# Patient Record
Sex: MALE | Race: AMERICAN INDIAN OR ALASKA NATIVE
[De-identification: names, ages, dates, MRNs, and addresses within clinical notes are randomized per-mention and may not be internally consistent; named-entity substitution may affect disease eponyms.]

---

## 2017-05-11 ENCOUNTER — HOSPITAL ENCOUNTER (INPATIENT)
Dept: HOSPITAL 42 - ED | Age: 82
LOS: 4 days | Discharge: HOME HEALTH | DRG: 313 | End: 2017-05-15
Attending: INTERNAL MEDICINE | Admitting: HOSPITALIST
Payer: MEDICARE

## 2017-05-11 VITALS — BODY MASS INDEX: 31.4 KG/M2

## 2017-05-11 DIAGNOSIS — G62.9: ICD-10-CM

## 2017-05-11 DIAGNOSIS — H40.9: ICD-10-CM

## 2017-05-11 DIAGNOSIS — Z79.82: ICD-10-CM

## 2017-05-11 DIAGNOSIS — I51.7: ICD-10-CM

## 2017-05-11 DIAGNOSIS — H91.90: ICD-10-CM

## 2017-05-11 DIAGNOSIS — I08.3: ICD-10-CM

## 2017-05-11 DIAGNOSIS — Z85.038: ICD-10-CM

## 2017-05-11 DIAGNOSIS — Z79.899: ICD-10-CM

## 2017-05-11 DIAGNOSIS — Z87.891: ICD-10-CM

## 2017-05-11 DIAGNOSIS — Z90.49: ICD-10-CM

## 2017-05-11 DIAGNOSIS — Z85.840: ICD-10-CM

## 2017-05-11 DIAGNOSIS — Z80.9: ICD-10-CM

## 2017-05-11 DIAGNOSIS — I11.9: ICD-10-CM

## 2017-05-11 DIAGNOSIS — R60.0: ICD-10-CM

## 2017-05-11 DIAGNOSIS — Z92.21: ICD-10-CM

## 2017-05-11 DIAGNOSIS — Z82.3: ICD-10-CM

## 2017-05-11 DIAGNOSIS — R07.89: Primary | ICD-10-CM

## 2017-05-11 DIAGNOSIS — R41.82: ICD-10-CM

## 2017-05-11 DIAGNOSIS — I44.0: ICD-10-CM

## 2017-05-11 DIAGNOSIS — R40.2412: ICD-10-CM

## 2017-05-11 DIAGNOSIS — H54.8: ICD-10-CM

## 2017-05-11 LAB
ADD MANUAL DIFF?: NO
ALBUMIN/GLOB SERPL: 1.1 {RATIO} (ref 1.1–1.8)
ALP SERPL-CCNC: 45 U/L (ref 38–133)
ALT SERPL-CCNC: 54 U/L (ref 7–56)
AST SERPL-CCNC: 28 U/L (ref 15–59)
BASOPHILS # BLD AUTO: 0.01 K/MM3 (ref 0–2)
BASOPHILS NFR BLD: 0.3 % (ref 0–3)
BILIRUB SERPL-MCNC: 0.6 MG/DL (ref 0.2–1.3)
BUN SERPL-MCNC: 19 MG/DL (ref 7–21)
CALCIUM SERPL-MCNC: 9.5 MG/DL (ref 8.4–10.5)
CHLORIDE SERPL-SCNC: 105 MMOL/L (ref 98–107)
CO2 SERPL-SCNC: 29 MMOL/L (ref 21–33)
EOSINOPHIL # BLD: 0.2 10*3/UL (ref 0–0.7)
EOSINOPHIL NFR BLD: 5.4 % (ref 1.5–5)
ERYTHROCYTE [DISTWIDTH] IN BLOOD BY AUTOMATED COUNT: 12.8 % (ref 11.5–14.5)
GLOBULIN SER-MCNC: 3.6 GM/DL
GLUCOSE SERPL-MCNC: 91 MG/DL (ref 70–110)
GRANULOCYTES # BLD: 1.79 10*3/UL (ref 1.4–6.5)
GRANULOCYTES NFR BLD: 46.2 % (ref 50–68)
HCT VFR BLD CALC: 35 % (ref 42–52)
LYMPHOCYTES # BLD: 1.5 10*3/UL (ref 1.2–3.4)
LYMPHOCYTES NFR BLD AUTO: 38 % (ref 22–35)
MCH RBC QN AUTO: 32.3 PG (ref 25–35)
MCHC RBC AUTO-ENTMCNC: 33.7 G/DL (ref 31–37)
MCV RBC AUTO: 95.9 FL (ref 80–105)
MONOCYTES # BLD AUTO: 0.4 10*3/UL (ref 0.1–0.6)
MONOCYTES NFR BLD: 10.1 % (ref 1–6)
PLATELET # BLD: 212 10^3/UL (ref 120–450)
PMV BLD AUTO: 9.7 FL (ref 7–11)
POTASSIUM SERPL-SCNC: 4.3 MMOL/L (ref 3.6–5)
PROT SERPL-MCNC: 7.5 G/DL (ref 5.8–8.3)
SODIUM SERPL-SCNC: 142 MMOL/L (ref 132–148)
TROPONIN I SERPL-MCNC: < 0.01 NG/ML
WBC # BLD AUTO: 3.9 10^3/UL (ref 4.5–11)

## 2017-05-11 NOTE — RAD
HISTORY:

chest pain  



COMPARISON:

5/3/2017 



FINDINGS:



LUNGS:

No active pulmonary disease.



PLEURA:

No significant pleural effusion identified, no pneumothorax apparent.



CARDIOVASCULAR:

Normal heart size.  Right subclavian central venous port.



OSSEOUS STRUCTURES:

No significant abnormalities.



VISUALIZED UPPER ABDOMEN:

Normal.



OTHER FINDINGS:

None.



IMPRESSION:

No active disease.

## 2017-05-11 NOTE — CP.PCM.HP
<Huang Jo - Last Filed: 05/11/17 19:01>





History of Present Illness





- History of Present Illness


History of Present Illness: 





CC: Chest pain





86yo M with PMHx of HTN, Colon CA s/p resection, Eye CA s/p L eye resection 

here for evaluation of chest pain. Patient was at home when he was lifting a 

heavy object and experienced a left sided chest and shoulder pain with 

shortness of breath. Pain lasted about 20mins and has since resolved. Pain 

radiated to the left shoulder. At its worst, pain was 7/10. He has never 

experienced a pain such as this before. No exacerbating or remitting factors. 

He states that he has been increasingly more short of breath over the past 

week. He also noticed bilateral lower extremity edema but is unsure when it 

started. He is ambulatory with cane and assistance with home health aide. 

Denies diaphoresis. Denies N/V/D. No Abd pain. No F/C. No Headache. No urinary 

complaints.





PMD: Mutterperl





PMHx: HTN, Colon CA s/p chemo and resection 1994, L eye CA s/p resection 1992. 


PSHx: Colon CA resection 1994, L Eye CA s/p resection. Right Inguinal hernia 

repair


Family Hx: Mother with unknown CA


Social Hx: Previous smoker, quit 2000, Current occasional ETOH use (beer, last 

use 2 weeks ago), Denies any illicit drugs. Lives at home with daughter


NKDA


Meds: Amlodipine, Tamsulosin





Present on Admission





- Present on Admission


Any Indicators Present on Admission: No





Review of Systems





- Review of Systems


All systems: reviewed and no additional remarkable complaints except





- Constitutional


Constitutional: absent: Chills, Fever





- EENT


Eyes: Loss of Vision (left eye)


Ears: absent: Decreased Hearing


Nose/Mouth/Throat: absent: Epistaxis





- Cardiovascular


Cardiovascular: Chest Pain, Dyspnea, Leg Edema.  absent: Diaphoresis





- Respiratory


Respiratory: Dyspnea.  absent: Cough, Hemoptysis





- Gastrointestinal


Gastrointestinal: absent: Abdominal Pain, Diarrhea, Nausea, Vomiting





- Genitourinary


Genitourinary: absent: Dysuria





- Musculoskeletal


Musculoskeletal: absent: Back Pain





- Neurological


Neurological: absent: Abnormal Gait





- Psychiatric


Psychiatric: absent: Anxiety





- Endocrine


Endocrine: absent: Palpitations





Past Patient History





- Infectious Disease


Hx of Infectious Diseases: None





- Tetanus Immunizations


Tetanus Immunization: Unknown





- Past Medical History & Family History


Past Family History: Reviewed and not pertinent





- Past Social History


Smoking Status: Former Smoker





- CARDIAC


Hx Cardiac Disorders: Yes


Hx Hypertension: Yes





- PULMONARY


Hx Respiratory Disorders: No





- NEUROLOGICAL


Hx Neurological Disorder: No





- HEENT


Hx HEENT Problems: Yes


Hx Blind: Yes (left eye cancer)


Hx Glaucoma: Yes (right eye)





- RENAL


Hx Chronic Kidney Disease: No





- ENDOCRINE/METABOLIC


Hx Endocrine Disorders: No





- HEMATOLOGICAL/ONCOLOGICAL


Hx Blood Disorders: No





- INTEGUMENTARY


Hx Dermatological Problems: No





- MUSCULOSKELETAL/RHEUMATOLOGICAL


Hx Musculoskeletal Disorders: No





- GASTROINTESTINAL


Hx Gastrointestinal Disorders: No





- GENITOURINARY/GYNECOLOGICAL


Hx Genitourinary Disorders: No





- PSYCHIATRIC


Hx Psychophysiologic Disorder: No


Hx Depression: No


Hx Emotional Abuse: No


Hx Physical Abuse: No


Hx Substance Use: No





- SURGICAL HISTORY


Hx Eye Surgery: Yes


Other/Comment: intestinal surgery





- ANESTHESIA


Hx Anesthesia: Yes


Hx Anesthesia Reactions: No


Hx Malignant Hyperthermia: No





Meds


Home Medications: 


 Home Medication List











 Medication  Instructions  Recorded  Confirmed  Type


 


hydroCHLOROthiazide [Microzide] 12.5 mg PO DAILY #30 cap 05/12/17  Rx











Allergies/Adverse Reactions: 


 Allergies











Allergy/AdvReac Type Severity Reaction Status Date / Time


 


No Known Allergies Allergy   Verified 05/11/17 18:20














Physical Exam





- Constitutional


Appears: Well, No Acute Distress





- Head Exam


Head Exam: ATRAUMATIC, NORMAL INSPECTION, NORMOCEPHALIC





- Eye Exam


Eye Exam: absent: Scleral icterus


Additional comments: 





Left eye s/p surgery. Right eye EOMI





- ENT Exam


ENT Exam: Mucous Membranes Moist





- Respiratory Exam


Respiratory Exam: Clear to Auscultation Bilateral, NORMAL BREATHING PATTERN.  

absent: Accessory Muscle Use, Rales, Rhonchi, Wheezes





- Cardiovascular Exam


Cardiovascular Exam: RRR, +S1, +S2.  absent: JVD





- GI/Abdominal Exam


GI & Abdominal Exam: Normal Bowel Sounds, Soft.  absent: Distended, Guarding, 

Rebound





- Extremities Exam


Extremities exam: Positive for: pedal edema


Additional comments: 





bilateral 2+ pitting pretibial edema





- Neurological Exam


Neurological exam: Alert, Oriented x3





- Psychiatric Exam


Psychiatric exam: Normal Affect, Normal Mood





- Skin


Skin Exam: Dry, Intact, Normal Color, Warm





Results





- Vital Signs


Recent Vital Signs: 





 Last Vital Signs











Temp      


 


Pulse  78   05/11/17 17:05


 


Resp  18   05/11/17 17:05


 


BP  141/96 H  05/11/17 17:05


 


Pulse Ox  96   05/11/17 17:05














- Labs


Result Diagrams: 


 05/11/17 16:17





 05/11/17 16:17


Labs: 





 Laboratory Results - last 24 hr











  05/11/17 05/11/17





  16:17 16:17


 


WBC  3.9 L D 


 


RBC  3.65 


 


Hgb  11.8 L 


 


Hct  35.0 L 


 


MCV  95.9 


 


MCH  32.3 


 


MCHC  33.7 


 


RDW  12.8 


 


Plt Count  212 


 


MPV  9.7 


 


Gran %  46.2 L 


 


Lymph % (Auto)  38.0 H 


 


Mono % (Auto)  10.1 H 


 


Eos % (Auto)  5.4 H 


 


Baso % (Auto)  0.3 


 


Gran #  1.79 


 


Lymph #  1.5 


 


Mono #  0.4 


 


Eos #  0.2 


 


Baso #  0.01 


 


Sodium   142


 


Potassium   4.3


 


Chloride   105


 


Carbon Dioxide   29


 


Anion Gap   12


 


BUN   19


 


Creatinine   1.1


 


Est GFR ( Amer)   > 60


 


Est GFR (Non-Af Amer)   > 60


 


Random Glucose   91


 


Calcium   9.5


 


Total Bilirubin   0.6


 


AST   28


 


ALT   54


 


Alkaline Phosphatase   45


 


Lactate Dehydrogenase   417


 


Total Creatine Kinase   76


 


Troponin I   < 0.01


 


NT-Pro-B Natriuret Pep   59.7


 


Total Protein   7.5


 


Albumin   3.8


 


Globulin   3.6


 


Albumin/Globulin Ratio   1.1














- EKG Data


EKG Interpreted by: Myself


EKG shows normal: Sinus rhythm


Rate: Normal





- EKG Data


EKG comments: 





1st degree AV Block. OR prolonged. No ST changes





Assessment & Plan





- Assessment and Plan (Free Text)


Assessment: 





86yo M with PMHx of HTN, Colon CA s/p chemo and resection, L Eye CA s/p 

resection here for evaluation of SOB and CP





1. Chest pain


EKG- no ST changes. 1st degree AV block. OR prolonged


Troponin negative x1. 


pBNP normal


CXR - no active disease


serial troponins


Consult Cardiology, Dr. Morrissey, appreciate recs


NPO past midnight for possible Stress test in the AM. f/u Cardio recs


f/u TSH


f/u Lipid panel


f/u HbA1c


f/u ECHO


tele monitoring


ASA 81 daily





2. Lower extremity edema


f/u ECHO


f/u Bilateral LE doppler


continue to monitor


Strict I&Os


Fall precautions





3. Hx of HTN


continue home meds


Amlodipine 5mg PO Daily





4. PPx


SCDs


Famotidine 20mg PO Daily





Discussed case with Dr. Gary Jo PGY1


[phone]





<Ivonne Vega - Last Filed: 05/13/17 16:11>





Results





- Vital Signs


Recent Vital Signs: 





 Last Vital Signs











Temp  98.5 F   05/13/17 11:46


 


Pulse  70   05/13/17 11:46


 


Resp  20   05/13/17 11:46


 


BP  154/77 H  05/13/17 11:46


 


Pulse Ox  97   05/13/17 05:51














- Labs


Result Diagrams: 


 05/13/17 07:00





 05/13/17 07:00


Labs: 





 Laboratory Results - last 24 hr











  05/13/17 05/13/17





  07:00 07:00


 


WBC  3.3 L 


 


RBC  3.77 


 


Hgb  12.0 L 


 


Hct  35.7 L 


 


MCV  94.7 


 


MCH  31.8 


 


MCHC  33.6 


 


RDW  12.6 


 


Plt Count  210 


 


MPV  9.6 


 


Gran %  45.5 L 


 


Lymph % (Auto)  37.4 H 


 


Mono % (Auto)  10.5 H 


 


Eos % (Auto)  6.0 H 


 


Baso % (Auto)  0.6 


 


Gran #  1.52 


 


Lymph #  1.3 


 


Mono #  0.4 


 


Eos #  0.2 


 


Baso #  0.02 


 


Sodium   140


 


Potassium   3.7


 


Chloride   104


 


Carbon Dioxide   27


 


Anion Gap   13


 


BUN   15


 


Creatinine   1.1


 


Est GFR ( Amer)   > 60


 


Est GFR (Non-Af Amer)   > 60


 


Random Glucose   85


 


Calcium   9.2














Attending/Attestation





- Attestation


I have personally seen and examined this patient.: Yes


I have fully participated in the care of the patient.: Yes


I have reviewed all pertinent clinical information: Yes


Notes (Text): 


I have seen and examined this patient at bedside. This is 87 year old male with 

history of HTN, former smoker, colon cancer s/p resection & chemo, left eye 

cancer s/p resection who got admitted for evaluation of chest pain which is 

most likely musculoskeletal in origin however will need to rule out ACS. Will 

order serial troponins and ekg. First set of troponin and ekg is normal. BNP is 

normal. CXR is negative. Will check tsh, hba1c, LE duplex and echo. Will 

consult cardiologist for possible stress test. Will make the patient npo past 

midnight. DC norvasc as he has bilateral LE edema. Upon discharge patient will 

follow up with Dr Hedrick. 


Dr Ivonne Vega

## 2017-05-12 LAB
ADD MANUAL DIFF?: NO
ALBUMIN/GLOB SERPL: 1.2 {RATIO} (ref 1.1–1.8)
ALP SERPL-CCNC: 47 U/L (ref 38–133)
ALT SERPL-CCNC: 36 U/L (ref 7–56)
APTT BLD: 30.1 SECONDS (ref 23.7–30.8)
AST SERPL-CCNC: 24 U/L (ref 15–59)
BASOPHILS # BLD AUTO: 0.02 K/MM3 (ref 0–2)
BASOPHILS NFR BLD: 0.5 % (ref 0–3)
BILIRUB SERPL-MCNC: 1 MG/DL (ref 0.2–1.3)
BUN SERPL-MCNC: 17 MG/DL (ref 7–21)
CALCIUM SERPL-MCNC: 9.3 MG/DL (ref 8.4–10.5)
CHLORIDE SERPL-SCNC: 108 MMOL/L (ref 98–107)
CHOLEST SERPL-MCNC: 199 MG/DL (ref 130–200)
CO2 SERPL-SCNC: 27 MMOL/L (ref 21–33)
EOSINOPHIL # BLD: 0.2 10*3/UL (ref 0–0.7)
EOSINOPHIL NFR BLD: 4.5 % (ref 1.5–5)
ERYTHROCYTE [DISTWIDTH] IN BLOOD BY AUTOMATED COUNT: 12.7 % (ref 11.5–14.5)
GLOBULIN SER-MCNC: 3.2 GM/DL
GLUCOSE SERPL-MCNC: 83 MG/DL (ref 70–110)
GRANULOCYTES # BLD: 1.9 10*3/UL (ref 1.4–6.5)
GRANULOCYTES NFR BLD: 50.2 % (ref 50–68)
HCT VFR BLD CALC: 35.9 % (ref 42–52)
INR PPP: 1.01 (ref 0.93–1.08)
LYMPHOCYTES # BLD: 1.4 10*3/UL (ref 1.2–3.4)
LYMPHOCYTES NFR BLD AUTO: 35.6 % (ref 22–35)
MCH RBC QN AUTO: 32.4 PG (ref 25–35)
MCHC RBC AUTO-ENTMCNC: 34 G/DL (ref 31–37)
MCV RBC AUTO: 95.2 FL (ref 80–105)
MONOCYTES # BLD AUTO: 0.4 10*3/UL (ref 0.1–0.6)
MONOCYTES NFR BLD: 9.2 % (ref 1–6)
PLATELET # BLD: 196 10^3/UL (ref 120–450)
PMV BLD AUTO: 9.5 FL (ref 7–11)
POTASSIUM SERPL-SCNC: 4 MMOL/L (ref 3.6–5)
PROT SERPL-MCNC: 6.9 G/DL (ref 5.8–8.3)
SODIUM SERPL-SCNC: 139 MMOL/L (ref 132–148)
TROPONIN I SERPL-MCNC: 0.01 NG/ML
WBC # BLD AUTO: 3.8 10^3/UL (ref 4.5–11)

## 2017-05-12 NOTE — CARD
--------------- APPROVED REPORT --------------





EXAM: Two-dimensional and M-mode echocardiogram with Doppler and 

color Doppler.



INDICATION

Chest Pain LVFX



2D DIMENSIONS 

Left Atrium (2D)5.2   (1.6-4.0cm)IVSd1.0   (0.7-1.1cm)

LVDd4.8   (3.9-5.9cm)PWd1.2   (0.7-1.1cm)

LVDs3.2   (2.5-4.0cm)FS (%) 34.4   %

LVEF (%)63.6   (>50%)



M-Mode DIMENSIONS 

Aortic Root3.40   (2.2-3.7cm)Aortic Cusp Exc.1.70   (1.5-2.0cm)



Aortic Valve

AoV Peak Ocwdhwlx434.0cm/sAoV VTI28.1cmAO Peak GR.7mmHg

LVOT Peak Mczsgpbv44.7cm/sLVOT VTI18.40cmAO Mean GR.4mmHg



Mitral Valve

MV E Zwnluhdb70.3cm/sMV A Exzrnhfw59.5cm/sE/A ratio0.7



TDI

Lateral E' Peak V7.12cm/sMedial E' Peak V6.43cm/sE/Lateral E'7.9

E/Medial E'8.8



Pulmonary Valve

PV Peak Ztfqatuk43.4cm/sPV Peak Grad.3mmHg



Tricuspid Valve

TR Peak Rayqdaez649zd/sRAP MCGLZWLY89jhJiTM Peak Gr.23mmHg

[id number]



 LEFT VENTRICLE 

The left ventricle is normal size. There is borderline to mild 

concentric left ventricular hypertrophy. The left ventricular 

function is normal.EF-60-65% There is normal LV segmental wall 

motion. Transmitral Doppler flow pattern is Grade III-reversible 

restrictive diastolic dysfunction. No left ventricle thrombus noted 

on this study. There is no ventricular septal defect visualized. 

There is no left ventricular aneurysm. There is no mass noted in the 

left ventricle.



 RIGHT VENTRICLE 

The right ventricle is normal size. There is normal right ventricular 

wall thickness. The right ventricular systolic function is normal.



 ATRIA 

The left atrium is mildly dilated. The right atrium size is normal. 

The interatrial septum is intact with no evidence for an atrial 

septal defect.



 AORTIC VALVE 

The aortic valve is thickened but opens well. There is trace aortic 

regurgitation. There is no aortic valvular stenosis. There is no 

aortic valvular vegetation.



 MITRAL VALVE 

The mitral valve is thickened but opens well. Mitral annular 

calcification is mild to moderate. Mitral regurgitation is mild. 

There is no mitral valve stenosis. There is no evidence of mitral 

valve prolapse.



 TRICUSPID VALVE 

The tricuspid valve leaflets are thickened , but open well. There is 

mild tricuspid regurgitation.RVSP-29 mmof Hg. There is no tricuspid 

valve stenosis. There is no tricuspid valve prolapse or vegetation.



 PULMONIC VALVE 

The pulmonic valve is borderline thickened. There is trace pulmonic 

valvular regurgitation. There is no pulmonic valvular stenosis.



 GREAT VESSELS 

The aortic root is normal in size. The ascending aorta is normal in 

size. The pulmonary artery is normal. The IVC is normal in size and 

collapses >50% with inspiration.



 PERICARDIAL EFFUSION 

There is no pleural effusion. There is no pericardial effusion.



<Conclusion>

The left ventricle is normal size.

There is borderline to mild concentric left ventricular hypertrophy.

The left ventricular function is normal.EF-60-65%

There is trace aortic regurgitation.

Mitral regurgitation is mild.

There is mild tricuspid regurgitation.RVSP-29 mmof Hg.

There is trace pulmonic valvular regurgitation.

The IVC is normal in size and collapses >50% with inspiration.

There is no pericardial effusion.

## 2017-05-12 NOTE — CP.PCM.PN
<Danyelle Kimbrough - Last Filed: 05/12/17 17:21>





Subjective





- Date & Time of Evaluation


Date of Evaluation: 05/12/17


Time of Evaluation: 17:21





- Subjective


Subjective: 





HOSPITALISTS PROGRESS NOTE





Pt is seen and examined at bedside.  No acute events overnight.  Patient state 

he slept well.  He states that his chest pain is not present currently but it 

is reproducible with movement of left arm and with palpation.  He denies having 

any SOB, abd pain, N/V/D/C. After speaking with patient's daughters, patient 

has not been at his baseline mentation for about 2 weeks now.  Daughters state 

that his behavior is more aggressive towards women and he has been more clumsy 

lately bumping into stuff.   





Objective





- Vital Signs/Intake and Output


Vital Signs (last 24 hours): 


 











Temp Pulse Resp BP Pulse Ox


 


 98.5 F   73   20   140/90   96 


 


 05/12/17 05:46  05/12/17 10:00  05/12/17 05:46  05/12/17 10:00  05/12/17 05:46








Intake and Output: 


 











 05/12/17 05/12/17





 06:59 18:59


 


Intake Total 0 540


 


Output Total 550 700


 


Balance -550 -160














- Medications


Medications: 


 Current Medications





Aspirin (Aspirin Chewable)  81 mg PO DAILY [facility]


   Last Admin: 05/12/17 13:50 Dose:  81 mg


Famotidine (Pepcid)  20 mg PO DAILY [facility]


   Last Admin: 05/12/17 13:52 Dose:  20 mg


Hydrochlorothiazide (Microzide)  12.5 mg PO DAILY [facility]


   Last Admin: 05/12/17 13:51 Dose:  12.5 mg


Ondansetron HCl (Zofran Inj)  4 mg IVP Q6 PRN


   PRN Reason: Nausea/Vomiting


Tamsulosin HCl (Flomax)  0.4 mg PO DAILY [facility]


   Last Admin: 05/12/17 13:50 Dose:  0.4 mg











- Labs


Labs: 


 





 05/12/17 07:00 





 05/12/17 07:00 





 











PT  10.9 Seconds (9.9-11.8)   05/12/17  07:00    


 


INR  1.01  (0.93-1.08)   05/12/17  07:00    


 


APTT  30.1 Seconds (23.7-30.8)   05/12/17  07:00    














- Constitutional


Appears: Non-toxic, No Acute Distress





- Head Exam


Head Exam: ATRAUMATIC





- Eye Exam


Eye Exam: EOMI





- ENT Exam


ENT Exam: Mucous Membranes Moist





- Respiratory Exam


Respiratory Exam: Clear to Ausculation Bilateral, NORMAL BREATHING PATTERN.  

absent: Rales, Rhonchi, Wheezes





- Cardiovascular Exam


Cardiovascular Exam: REGULAR RHYTHM, +S1, +S2.  absent: Gallop, Rubs, Murmur


Additional comments: 





left sided tenderness to palpation





- GI/Abdominal Exam


GI & Abdominal Exam: Soft, Normal Bowel Sounds.  absent: Distended, Firm, 

Guarding, Rigid, Tenderness





- Extremities Exam


Extremities Exam: absent: Pedal Edema, Tenderness





- Neurological Exam


Neurological Exam: Alert, Awake, Oriented x3





- Psychiatric Exam


Psychiatric exam: Normal Affect, Normal Mood





- Skin


Skin Exam: Dry, Intact, Normal Color, Warm





Assessment and Plan





- Assessment and Plan (Free Text)


Assessment: 





88yo M with PMHx of HTN, Colon CA s/p chemo and resection, L Eye CA s/p 

resection here for evaluation of SOB and CP.  Serial trops are negative and EKG 

showed no ST changes. Pro BNP is WNL and CXR is negative.   





1. Chest pain


Cardiology, Dr. Morrissey, is consulted.  appreciate recs


Awaiting stress test and echo result


TSH, lipid panel and HgbA1c are WNL


tele monitoring


ASA 81 daily





2. AMS x 2 weeks


Will get CT of head without contrast


Psych, Dr. Vinson is consulted


Neurology, Dr. Camp is consulted. 





3. Lower extremity edema


ECHO is pending 


LE doppler US is negative for DVT


continue to monitor


Strict I&Os


Fall precautions





4. Hx of HTN


Patient is switched to HCTZ 12.5 mg po qd.  Stopped norvasc due to LE swelling.

  





5. PPx


SCDs


Famotidine 20mg PO Daily





Discussed case with attending Dr. Vega








<Ivonne Vega - Last Filed: 05/13/17 16:18>





Objective





- Vital Signs/Intake and Output


Vital Signs (last 24 hours): 


 











Temp Pulse Resp BP Pulse Ox


 


 98.5 F   70   20   154/77 H  97 


 


 05/13/17 11:46  05/13/17 11:46  05/13/17 11:46  05/13/17 11:46  05/13/17 05:51








Intake and Output: 


 











 05/13/17 05/13/17





 06:59 18:59


 


Intake Total  1220


 


Balance  1220














- Medications


Medications: 


 Current Medications





Aspirin (Aspirin Chewable)  81 mg PO DAILY [facility]


   Last Admin: 05/13/17 10:23 Dose:  81 mg


Famotidine (Pepcid)  20 mg PO DAILY [facility]


   Last Admin: 05/13/17 10:23 Dose:  20 mg


Haloperidol (Haldol)  0.25 mg PO Q6 PRN; Protocol


   PRN Reason: Agitation


Hydrochlorothiazide (Microzide)  12.5 mg PO DAILY [facility]


   Last Admin: 05/13/17 10:23 Dose:  12.5 mg


Ondansetron HCl (Zofran Inj)  4 mg IVP Q6 PRN


   PRN Reason: Nausea/Vomiting


Tamsulosin HCl (Flomax)  0.4 mg PO DAILY [facility]


   Last Admin: 05/13/17 10:23 Dose:  0.4 mg











- Labs


Labs: 


 





 05/13/17 07:00 





 05/13/17 07:00 





 











PT  10.9 Seconds (9.9-11.8)   05/12/17  07:00    


 


INR  1.01  (0.93-1.08)   05/12/17  07:00    


 


APTT  30.1 Seconds (23.7-30.8)   05/12/17  07:00    














Attending/Attestation





- Attestation


I have personally seen and examined this patient.: Yes


I have fully participated in the care of the patient.: Yes


I have reviewed all pertinent clinical information, including history, physical 

exam and plan: Yes


Notes (Text): 








I have seen and examined this patient at bedside. This is 87 year old male with 

history of HTN, former smoker, colon cancer s/p resection & chemo, left eye 

cancer s/p resection who got admitted for evaluation of chest pain which is 

most likely musculoskeletal in origin.


Serial troponin and ekg was within normal limits. Given the risk factors, 

patient underwent stress test. Result of which is pending at this time. 


Patients daughter expressed concern today that patient has been very forgetful 

for the past 2 weeks and has been very clumsy. He also has developed behavioral 

disturbance and has developed agitation which is probably secondary to 

cognitive impairment however we need to rule out other causes. Will order CT 

scan, neuro and psych consult.  


LE swelling has improved. LE duplex is negative.


Start HCTZ for HTN.


Upon discharge patient will follow up with Dr Hedrick. 


Dr Ivonne Vega

## 2017-05-12 NOTE — CON
DATE: 05/12/2017



CHIEF COMPLAINT:  History of agitation and behavioral change for the past 2 weeks noted by daughter.



HISTORY OF PRESENT ILLNESS:  An 87-year-old man who is legally blind with past medical history of hyp
ertension, colon cancer status post resection, history of eye cancer status post left eye resection, 
who goes to a  and receives Meals on Wheels at home, who came in for shortness of breath and s
harp chest pain, which has been worked up by cardiology where he had an echocardiogram, which showed 
an EF of 60-65% with mild concentric left ventricular hypertrophy and ultrasound of the lower extremi
ties showing no evidence of deep vein thrombosis due to his lower extremity swelling. I was consulted
 because it was noted by the daughter that for the past 2 weeks, he has been more aggressive toward w
deshawnn and has been very clumsy and lately bumping into stuff. During my evaluation, he is alert, orien
abhay to person and place and year, knows the President but has poor attention and slow thought process
.  Recall after 5 minutes is 0/3.  He is legally blind.  He moves all extremities, no pronator drift 
seen.  He does have evidence of some underlying peripheral neuropathy from likely history of cancer a
s well as chemotherapy residuals.  He had a CAT scan.  Preliminary report pending.  Shows no acute in
tracranial abnormalities, chronic ischemic changes.  Awaiting official report.  He seems to be at his
 baseline, has mild to moderate cognitive impairment.



PAST MEDICAL HISTORY:  History of hypertension, colon cancer status post chemo and resection, left ey
e cancer status post resection.



REVIEW OF SYSTEMS:  A 14-point review of systems is negative except for the HPI.



MEDICATIONS: Reviewed via nursing reconciliation sheet.



ALLERGIES:  No known drug allergies.



FAMILY HISTORY: Noncontributory.



SOCIAL HISTORY:  No illicit drug use, smoking, or ETOH abuse.



FAMILY HISTORY:  Noncontributory.



PHYSICAL EXAMINATION:

VITAL SIGNS:  Temperature 98.3, pulse rate of 81, blood pressure 141/76, respiratory rate 20, oxygen 
saturation 96% on room air.

GENERAL:  The patient is sitting up in bed in no acute distress.

HEENT:  Atraumatic.  Extraocular muscles intact.  Has left eye resection from left eye cancer.

NECK:  Supple, no JVD, no adenopathy noted.

HEART:  S1, S2, normal rate and rhythm.  No murmurs, rubs, or gallops.

ABDOMEN:  Soft, nontender, nondistended.  Bowel sounds are present.

EXTREMITIES:  No clubbing, no cyanosis.  Peripheral pulses 2+ felt bilaterally.

NEUROLOGIC:  The patient is alert, oriented to person and place and year, has poor attention, slow th
ought process.  Recall after 5 minutes is 0/3.  Has a flat affect, very tangential and loose associat
ions of thoughts.  Cranial nerves II through XII are intact. Motor exam:  No pronator drift seen, sli
ght increased tone throughout. Sensory exam:  Decreased light touch and pinprick up to calves bilater
ally.  Decreased to vibration in the toes.  DTRs 2+ throughout, 1 at the knees and ankles. Coordinati
on:  Finger-to-nose is intact with eyes closed.  Gait is deferred for now.



LABORATORY DATA:  Sodium is 139, potassium 4, chloride of 108, carbon dioxide of 27, BUN of 17, creat
inine 1.  Random glucose of 8.3.  A1c is 4.7.



ASSESSMENT AND PLAN:  

1.  This is an 87-year-old man with past medical history of colon cancer status post chemotherapy and
 resection, history of hypertension, history of left eye cancer status post resection, legally blind,
 who is here in the hospital with shortness of breath and chest pain, followed by cardiology.  Echo s
howed ejection fraction at 60%.  Lower extremities showed no evidence of deep venous thrombosis.  EKG
 showed ST changes.  ProBNP is within normal limits.  Chest x-ray is negative.  Currently _____ cardi
ology recommendations. I was consulted for change in mental status for the past 2 weeks, becoming mor
e agitated and more clumsy.  His agitation is likely his cognitive impairment with behavioral disturb
ance.  Could consider low dose Seroquel 12.5 mg p.o. at bedtime and to get psychiatry's recommendatio
ns.

2.  Frequent orientation throughout the day and avoid nighttime interruptions.  He needs proper adequ
ate healthy heart diet and does have neuropathy from underlying history of cancer and chemotherapy, w
hich could be the reason why he has poor gait, balance, and is clumsy.  At this time, recommend physi
manisha therapy to help with his gait and balance and then continue with current present medical manageme
nt in terms of his cardiac issues.  No further neurological workup needed at this time. Possibly woul
d benefit from coenzyme Q10 400 mg p.o. daily, alpha lipoic acid 600 mg p.o. daily and outpatient Nam
enda XR 14 mg p.o. daily for underlying cognitive impairment.  At this time, continue with current pr
esent medical management.



Thank you for this consult.  Please reconsult if necessary. Will sign off.





__________________________________________

Huber Camp MD







cc:



DD: 05/12/2017 18:48:26  [id number]

TT: 05/12/2017 19:26:44

Confirmation # [id number]

Dictation # [id number]

ln

## 2017-05-12 NOTE — CARD
--------------- APPROVED REPORT --------------





Protocol: LEXISCAN

Test Type: Lexiscan Sestamibi Stress Test

Attending Physician: Dr. Guille Garcia

Referring Physician: Dr. Haseeb Matos  

Test Indications: Chest Pain

Height:5 ft  11  in

Weight:225lbs 

Medications: Norvas,Aspirin,Pepcid,Flomax

Medical History: 86 y/o male. Hx of chest pain,hypertension,

shortness of breath,former smoker,PVD,edema.

Target HR: 133 bpm

Resting ECG: RSR. RBBB.Prolonged KS.

Resting Heart Rate: 67 bpm

Resting Blood Pressure: 140/70mmHg

Submaximum (85%): 113 bpm



POST EXERCISE

Reason for Termination: Protocol completed

Target HR: No

Max HR: 65 bpm

59% of Maximum Predicted HR: 133 bpm

Exercise duration: 00:31 min:sec, 0 Stage

Exercise capacity: 1.0METs

Max Blood Pressure: 140/70mmHg

Blood Pressure response to exercise: normal resting BP - appropriate 

response

Heart Rate response to exercise: appropriate

Chest Pain: No, none

Angina index: 0

Arrhythmia: No, none

ST Change: No, none

Deviation: 0 mm



INTERPRETATION

Stress EKG Conclusion: IV LEXISCAN NUCLEAR STRESS TEST NEGATIVE FOR 

CHEST PAIN AND NEGATIVE FOR ST-T CHANGES.







NUCLEAR SCAN REPORT PENDING.



Signed by  Guille Garcia

Electronically Approved: 05/12/2017 12:50:05



EXAM: Myocardial Perfusion REST/STRESS

Stress Test Type: Pharmacologic



Imaging Protocol

Rest Spect myocardial perfusion imaging was performed in supine 

position 60 minutes following the injection of 10.3 mCi of Tc-99 

Myoview.

At peak stress, the patient was injected intravenously with 30.5mCi 

of Tc-99 tetrofosmin after an infusion time of 0 minutes and 10 

seconds.

Gated Stress Spect was performed 65 minutes after intravenous Tc-99 

Myoview injection.

The images were gated to evaluate regional wall motion and calculate 

ventricular ejection fraction.Images were reconstructed using 

backfilter projection method in short horizontal and verticle long 

axis. Spect slices were generated.



LV Perfusion

The quality of the study is good. The left ventricle is within normal 

limits in size. The right ventricle is unremarkable. The lung uptake 

is normal. The distribution of tracer reveals an area of mildly 

decreased perfusion in the mid to distal anterior and moderately and 

diffusely  decreased perfusion in the  inferior wall on the stress 

study. The remainder of the LV myocardium is unremarkable.  The rest 

myocardial perfusion study shows no significant change.



Wall Motion

Wall motion study shows good contractility of the left ventricle.  

LVEF = 60%.



Conclusion

1. Probably abnormal  SPECT myocardial perfusion study.

2. Mild, fixed,  anterior defect is  suggestive of  myocardial injury.

3. Fixed, inferior and inferolateral defects are probably due to 

diaphragmatic attenuation.

4. Normlal gated wall motion of the left ventricle.

## 2017-05-12 NOTE — CT
PROCEDURE:  CT HEAD WITHOUT CONTRAST.



HISTORY:

AMS x 2 weeks



COMPARISON:

None available. 



TECHNIQUE:

Axial computed tomography images were obtained through the head/brain 

without intravenous contrast.  



Radiation dose:



Total exam DLP = 725.84 mGy-cm.



This CT exam was performed using one or more of the following dose 

reduction techniques: Automated exposure control, adjustment of the 

mA and/or kV according to patient size, and/or use of iterative 

reconstruction technique.



FINDINGS:



HEMORRHAGE:

No acute parenchymal, subarachnoid or extra-axial hemorrhage. 



BRAIN:

Moderate to significant diffuse/confluent chronic white matter 

ischemic changes seen extending peripherally into the deep and 

subcortical white matter both cerebral hemispheres. In addition, 

there also appears to be a few scattered more discrete chronic 

lacunar-type infarcts within both basal nuclei. 



VENTRICLES:

Unremarkable. No hydrocephalus. 



CALVARIUM:

No acute calvarial fractures.



PARANASAL SINUSES:

Unremarkable as visualized. No significant inflammatory changes.



MASTOID AIR CELLS:

Unremarkable as visualized. No inflammatory changes.



OTHER FINDINGS:

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  Status 

post right cataract surgery.



IMPRESSION:

No acute intracranial hemorrhage.



Moderate to significant chronic white matter ischemic changes 

extending peripherally into the deep and subcortical white matter 

both cerebral hemispheres.  There are also scattered chronic 

bilateral basal nuclei lacunar type infarcts. 



Moderate generalized volume loss. 



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

## 2017-05-12 NOTE — US
HISTORY:

Leg pain and swelling. Evaluate for DVT



PHYSICIAN(S):  Darell Rogers MD.



TECHNIQUE:

Duplex sonography and color-flow Doppler with graded compression were 

used to evaluate the deep venous systems of both lower extremities. 



FINDINGS:

The visualized deep venous systems of both lower extremities are 

sonographically normal and compressible. Normal wave forms and 

augmentation are seen. There is no sonographic evidence for deep 

venous thrombosis in the visualized segments of both lower 

extremities.



IMPRESSION:

No sonographic evidence for deep venous thrombosis in the visualized 

segments of both lower extremities.

## 2017-05-12 NOTE — CON
DATE: 05/12/2017



SERVICE:  Cardiology.



CONSULTING PHYSICIAN:  Dr. Guille Morrissey.



REASON FOR CONSULTATION AND FOLLOWUP:  Shortness of breath, chest pain, rule out CAD.



BRIEF CLINICAL HISTORY:  This is an 87-year-old male, legally blind, who goes to a  center, hi
story of hypertension.  He said that he gets the food from the [employer] at his home.  He picke
d it up and went inside.  He felt some shortness of breath, though he gets usually shortness of breat
h, but ____ changes and some sharp pain in the chest, so called the daughter.  Daughter lives up the 
stairs, advised to come to the hospital for evaluation.  The patient denies any prior episode of ches
t pain, but complained of dyspnea on exertion.



PAST MEDICAL HISTORY:  Significant for hypertension, colon CA, status post resection, history of eye 
cancer, status post left eye resection.



PAST SURGICAL HISTORY:  Significant for colon cancer, history of resection of testicles, history of c
hemotherapy, history of resection of colon in 1994, history of left eye resection in 1992, history of
 right inguinal hernia repair as well.  



FAMILY HISTORY:  Noncontributory.



SOCIAL HISTORY:  Previous smoker, quit 2000, history of socially drinks alcohol.



ALLERGIES:  No known drug allergy.



CURRENT MEDICATIONS:  The patient is taking amlodipine for blood pressure and tamsulosin given by Dr. Matos.



REVIEW OF SYSTEMS:  As per HPI.



PHYSICAL EXAMINATION:

VITAL SIGNS:  Temperature afebrile, heart rate 70, blood pressure 141/82.

HEENT:  PERRLA.  Extraocular muscles intact.

NECK:  Supple.  No carotid bruits.  No thyromegaly.

CHEST:  Clear to auscultation.

HEART:  S1, S2 regular.

ABDOMEN:  Soft.

EXTREMITIES:  Clubbing and cyanosis negative.



BLOOD WORKUP:  As follows:  WBC 3.8, hemoglobin 12.2, hematocrit 35.9, platelet count 196.  Chemistry
 shows sodium 130, potassium 4, chloride 100, carbon dioxide 27, anion gap of 8, BUN 17, creatinine 1
.0.  Troponin 0.01, negative.  EKG showed normal sinus, marked sinus arrhythmia, right bundle branch 
block.:  



IMPRESSION:  Dyspnea on exertion, hypertension, shortness of breath, rule out ischemia, rule out hesham
nary artery disease though chest pain was atypical.  So far, troponin is negative, no evidence of acu
te coronary syndrome, no evidence of unstable angina, but given the multiple risk factors for coronar
y artery disease, suggest echo and a stress test today.  The patient lives by himself and is legally 
blind, so for risk stratification and for the sole reason because the patient cannot express and is l
egally blind, suggest a stress test before the patient goes home.  We will schedule today.  



Thank you, Dr. Vega, for providing us opportunity in taking care of the patient.  We will follow with
 you.





__________________________________________

Guille Morrissey MD







cc:Ivonne Vega MD



DD: 05/12/2017 07:51:37  [id number]

TT: 05/12/2017 09:20:45

Confirmation # [id number]

Dictation # [id number]

tn

## 2017-05-12 NOTE — CARD
--------------- APPROVED REPORT --------------





EKG Measurement

Heart [id number]

[id number]

[id number]

[id number]

[id number]



<Conclusion>

Sinus rhythm APCs

Right bundle branch block

STTW changes

## 2017-05-13 LAB
ADD MANUAL DIFF?: NO
BASOPHILS # BLD AUTO: 0.02 K/MM3 (ref 0–2)
BASOPHILS NFR BLD: 0.6 % (ref 0–3)
BUN SERPL-MCNC: 15 MG/DL (ref 7–21)
CALCIUM SERPL-MCNC: 9.2 MG/DL (ref 8.4–10.5)
CHLORIDE SERPL-SCNC: 104 MMOL/L (ref 95–110)
CO2 SERPL-SCNC: 27 MMOL/L (ref 21–33)
EOSINOPHIL # BLD: 0.2 10*3/UL (ref 0–0.7)
EOSINOPHIL NFR BLD: 6 % (ref 1.5–5)
ERYTHROCYTE [DISTWIDTH] IN BLOOD BY AUTOMATED COUNT: 12.6 % (ref 11.5–14.5)
GLUCOSE SERPL-MCNC: 85 MG/DL (ref 70–110)
GRANULOCYTES # BLD: 1.52 10*3/UL (ref 1.4–6.5)
GRANULOCYTES NFR BLD: 45.5 % (ref 50–68)
HCT VFR BLD CALC: 35.7 % (ref 42–52)
LYMPHOCYTES # BLD: 1.3 10*3/UL (ref 1.2–3.4)
LYMPHOCYTES NFR BLD AUTO: 37.4 % (ref 22–35)
MCH RBC QN AUTO: 31.8 PG (ref 25–35)
MCHC RBC AUTO-ENTMCNC: 33.6 G/DL (ref 31–37)
MCV RBC AUTO: 94.7 FL (ref 80–105)
MONOCYTES # BLD AUTO: 0.4 10*3/UL (ref 0.1–0.6)
MONOCYTES NFR BLD: 10.5 % (ref 1–6)
PLATELET # BLD: 210 10^3/UL (ref 120–450)
PMV BLD AUTO: 9.6 FL (ref 7–11)
POTASSIUM SERPL-SCNC: 3.7 MMOL/L (ref 3.6–5)
SODIUM SERPL-SCNC: 140 MMOL/L (ref 132–148)
WBC # BLD AUTO: 3.3 10^3/UL (ref 4.5–11)

## 2017-05-13 NOTE — CP.PCM.PN
<Troy Harper - Last Filed: 05/13/17 20:54>





Subjective





- Date & Time of Evaluation


Date of Evaluation: 05/13/17


Time of Evaluation: 08:20





- Subjective


Subjective: 





HOSPITALISTS PROGRESS NOTE





Pt is seen and examined at bedside.  No acute events overnight.  Patient state 

he slept well and had no complaints. He denies having any chest pain, SOB, abd 

pain, N/V/D/C. Daughter not present to determine if pt. is at baseline or not. 





Objective





- Vital Signs/Intake and Output


Vital Signs (last 24 hours): 


 











Temp Pulse Resp BP Pulse Ox


 


 98.1 F   68   18   142/87   97 


 


 05/13/17 16:45  05/13/17 16:45  05/13/17 16:45  05/13/17 16:45  05/13/17 05:51








Intake and Output: 


 











 05/13/17 05/14/17





 18:59 06:59


 


Intake Total 1220 


 


Balance 1220 














- Medications


Medications: 


 Current Medications





Aspirin (Aspirin Chewable)  81 mg PO DAILY [facility]


   Last Admin: 05/13/17 10:23 Dose:  81 mg


Famotidine (Pepcid)  20 mg PO DAILY [facility]


   Last Admin: 05/13/17 10:23 Dose:  20 mg


Haloperidol (Haldol)  0.25 mg PO Q6 PRN; Protocol


   PRN Reason: Agitation


Hydrochlorothiazide (Microzide)  12.5 mg PO DAILY [facility]


   Last Admin: 05/13/17 10:23 Dose:  12.5 mg


Ondansetron HCl (Zofran Inj)  4 mg IVP Q6 PRN


   PRN Reason: Nausea/Vomiting


Tamsulosin HCl (Flomax)  0.4 mg PO DAILY [facility]


   Last Admin: 05/13/17 10:23 Dose:  0.4 mg











- Labs


Labs: 


 





 05/13/17 07:00 





 05/13/17 07:00 





 











PT  10.9 Seconds (9.9-11.8)   05/12/17  07:00    


 


INR  1.01  (0.93-1.08)   05/12/17  07:00    


 


APTT  30.1 Seconds (23.7-30.8)   05/12/17  07:00    








- Constitutional


Appears: Non-toxic, No Acute Distress





- Head Exam


Head Exam: ATRAUMATIC





- Eye Exam


Eye Exam: EOMI





- ENT Exam


ENT Exam: Mucous Membranes Moist





- Respiratory Exam


Respiratory Exam: Clear to Ausculation Bilateral, NORMAL BREATHING PATTERN.  

absent: Rales, Rhonchi, Wheezes





- Cardiovascular Exam


Cardiovascular Exam: REGULAR RHYTHM, +S1, +S2.  absent: Gallop, Rubs, Murmur


Additional comments: 





left sided tenderness to palpation





- GI/Abdominal Exam


GI & Abdominal Exam: Soft, Normal Bowel Sounds.  absent: Distended, Firm, 

Guarding, Rigid, Tenderness





- Extremities Exam


Extremities Exam: absent: Pedal Edema, Tenderness





- Neurological Exam


Neurological Exam: Alert, Awake, Oriented x3





- Psychiatric Exam


Psychiatric exam: Normal Affect, Normal Mood





- Skin


Skin Exam: Dry, Intact, Normal Color, Warm





Assessment and Plan





- Assessment and Plan (Free Text)


Assessment: 





86yo M with PMHx of HTN, Colon CA s/p chemo and resection, L Eye CA s/p 

resection here for evaluation of SOB and CP.


Plan: 





1. Chest pain


Cardiology, Dr. Morrissey, is consulted.  appreciate recs


   -Cardiac Stress Test - please see full report


   -Probably Abnormal SPECT study


   -Fixed defect Ant & Ant Lat


   -ECHO EF 60% - please see full report


TSH, lipid panel and HgbA1c are WNL


d/c tele, transfer to med/surg


ASA 81 daily





2. AMS x 2 weeks


f/u with daughter to see if pt. is at baseline


CT of head without contrast - please see full report


   -No Acute Intracranial Hemorrhage





Psych, Dr. Vinson is consulted


   -agrees with Neuro to start Haldol PRN


   -might still be presenting with symptoms of delerium, cont 1:1


   -will continue to follow


Neurology, Dr. Camp is consulted. 


   -agitation likely his congnitive impairment with behavioral disturbance


   -Seroquel 12.5mg PO HS


   -rec on d/c 


      -coenzyme Q10 400mg PO daily


      -alpha lipoic acid 600mg po daily


      -Namenda XR 14mg po daily


   -cont current mgmt - sign off





3. Lower extremity edema


LE doppler US is negative for DVT


continue to monitor


Strict I&Os


Fall precautions





4. Hx of HTN


Patient is switched to HCTZ 12.5 mg po qd.  Stopped norvasc due to LE swelling.

  





5. PPx


SCDs


Famotidine 20mg PO Daily





Discussed case with attending Dr. Vega





<Ivonne Vega - Last Filed: 05/22/17 13:29>





Objective





- Vital Signs/Intake and Output


Vital Signs (last 24 hours): 


 











Temp Pulse Resp BP Pulse Ox


 


 97.9 F   71   20   136/73   95 


 


 05/14/17 17:02  05/14/17 17:02  05/14/17 17:02  05/14/17 17:02  05/14/17 06:00








Intake and Output: 


 











 05/14/17 05/14/17





 06:59 18:59


 


Intake Total 660 1200


 


Output Total 1500 550


 


Balance -840 650














- Medications


Medications: 


 Current Medications





Aspirin (Aspirin Chewable)  81 mg PO DAILY [facility]


   Last Admin: 05/14/17 09:29 Dose:  81 mg


Famotidine (Pepcid)  20 mg PO DAILY [facility]


   Last Admin: 05/14/17 09:29 Dose:  20 mg


Haloperidol (Haldol)  0.25 mg PO Q6 PRN; Protocol


   PRN Reason: Agitation


Hydrochlorothiazide (Microzide)  12.5 mg PO DAILY [facility]


   Last Admin: 05/14/17 09:29 Dose:  12.5 mg


Ondansetron HCl (Zofran Inj)  4 mg IVP Q6 PRN


   PRN Reason: Nausea/Vomiting


Quetiapine Fumarate (Seroquel)  12.5 mg PO HS [facility]


   PRN Reason: Protocol


   Last Admin: 05/13/17 21:34 Dose:  12.5 mg


Tamsulosin HCl (Flomax)  0.4 mg PO DAILY [facility]


   Last Admin: 05/14/17 09:29 Dose:  0.4 mg











- Labs


Labs: 


 





 05/14/17 07:40 





 05/14/17 07:40 





 











PT  10.9 Seconds (9.9-11.8)   05/12/17  07:00    


 


INR  1.01  (0.93-1.08)   05/12/17  07:00    


 


APTT  30.1 Seconds (23.7-30.8)   05/12/17  07:00    














Attending/Attestation





- Attestation


I have personally seen and examined this patient.: Yes


I have fully participated in the care of the patient.: Yes


I have reviewed all pertinent clinical information, including history, physical 

exam and plan: Yes


Notes (Text): 








I have seen and examined this patient at bedside. This is 87 year old male with 

history of HTN, former smoker, colon cancer s/p resection & chemo, left eye 

cancer s/p resection who got admitted for evaluation of chest pain which is 

most likely musculoskeletal in origin.


Serial troponin and ekg was within normal limits. Given the risk factors, 

patient underwent stress test which showed fixed defect. 


As per patients daughter, patient has been having periods of confusion and 

aggression toward females. Neuro and psych on board. Patient has mild cognitive 

impairment. Recommended coenzyme q10, alpha lipoic acid and namenda. Psych 

recommended to continue 1:1 and ordered haldol prn. CT head is negative.


Patient is currently alert, awake and oriented.


LE swelling has improved. LE duplex is negative.


Upon discharge patient will follow up with Dr Hedrick. 


Dr Ivonne Vega

## 2017-05-13 NOTE — CON
DATE: 05/13/2017



HISTORY OF PRESENT ILLNESS:  The patient is an 87-year-old -American male with no formal psych
iatric history, who was consulted by the medical team to psychiatry due to patient's change in mental
 status for the last 2 weeks.  Notes indicate that patient has been more difficult, more difficult to
 direct and more aggressive towards women, as well as more clumsy, lately bumping into stuff.



I met with patient at bedside and he is alert due to the fact that he is at [facility] an
d that it is 5/2017.  He is generally pleasant, but can be brutal.  The patient makes frequent jokes 
and he is hard of hearing and questioning me for repetition, not only because of his difficulty heari
ng, but also because of his comprehension.  The patient can be forgetful during the course of this in
[facility], but generally consistently reports that he has been feeling a little down lately, but genera
lly is enjoying life and has actually no thoughts of harming himself or wanting to die.  He denies cannon
ving any hallucinations and delusions were not elicited.  Regarding stressors, he does admit that he 
lost his wife over a year ago and thought he was dealing with it well; however, has been noticing mor
e and more that he has been missing her more and more.  He _____ is hopeful about things and indicate
s that "honest to goodness" he wants to live and is enjoying life.  The patient jokes frequently.  Hi
s jokes can be quite humorous _____ conversation; however, this provider does note that he is forgetf
ul and evades some questions that he does not know the responses to.  Regarding his aggression toward
 other females, he does indicate that it is very hard to be in a home "full of women."  He does indic
ate that multiple times in the course of our conversation, but denies having any thoughts of harming 
anybody or wanting to harm anybody.  I consider his insight and judgment impaired due to his forgetfu
lness and some inconsistency in responses.



VITAL SIGNS AND LABORATORIES:  Reviewed.



The patient was not started on any relevant psychiatric medications.



PSYCHIATRIC HISTORY:  The patient denies any psychiatric admissions or suicide attempts or psychiatri
c medication management.



SOCIAL HISTORY:  The patient is , lost his wife in 1/2016.  The patient resides with his daugh
ter and other female family members.  The patient reports that with his wife he has 5 sons and 4 daug
hters.  He denies any history of drug or alcohol use.  The patient is retired.  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 



IMPRESSION:  Due to the acuity of patient's mental status changes, I cannot rule out delirium and in 
a much older gentlemen like this, even a minor medical insult can contribute to patient's confusion a
nd aggression.  Delirium may take weeks to resolve even after the initial medical insult is improved.
  I cannot rule out the presence of dementia at this time either, as well as age-related cognitive de
eckert.  Can also consider pseudodementia as patient does report some low mood due to the death of his
 wife in the past year or so.



RECOMMENDATIONS:  At this time, I agree with having neurology follow up with patient.  The patient mi
ght benefit from a medication for dementia.  For aggression, I will put Haldol 0.25 mg p.o. q. 6 hour
s p.r.n.; however, he does appear to be fairly in control.  Would continue Haldol at this time until 
diagnosis is fully elucidated, whether it be delirium in which the Haldol would be beneficial on a te
mporary basis, or if it is mostly dementia, the Haldol can be beneficial on an outpatient basis.  Oswaldo salamanca, psychiatry will continue to follow up with patient.  He does not want to start an antidepres
karen at this time and we will continue to monitor his mental status.  As there is uncertainty in the 
etiology of the patient's change in mental status, I do not feel comfortable with patient being psych
iatrically cleared for discharge at this time, as he might still be presenting with symptoms of delir
ium and requires 1:1, as he cannot follow directions for his own safety on the hospital floor.  As me
ntioned, psychiatry will continue to follow up with patient and monitor his mental status.





__________________________________________

Balaji Vinson MD







cc:



DD: 05/13/2017 10:22:28  [id number]

TT: 05/13/2017 13:26:31

Confirmation # [id number]

Dictation # [id number]

rn

## 2017-05-14 VITALS — HEART RATE: 71 BPM

## 2017-05-14 VITALS — OXYGEN SATURATION: 95 %

## 2017-05-14 VITALS — TEMPERATURE: 97.9 F | SYSTOLIC BLOOD PRESSURE: 136 MMHG | DIASTOLIC BLOOD PRESSURE: 73 MMHG

## 2017-05-14 VITALS — RESPIRATION RATE: 20 BRPM

## 2017-05-14 LAB
ADD MANUAL DIFF?: NO
ALBUMIN/GLOB SERPL: 1 {RATIO} (ref 1.1–1.8)
ALP SERPL-CCNC: 44 U/L (ref 38–133)
ALT SERPL-CCNC: 33 U/L (ref 7–56)
AST SERPL-CCNC: 27 U/L (ref 15–59)
BASOPHILS # BLD AUTO: 0.01 K/MM3 (ref 0–2)
BASOPHILS NFR BLD: 0.2 % (ref 0–3)
BILIRUB SERPL-MCNC: 1 MG/DL (ref 0.2–1.3)
BUN SERPL-MCNC: 15 MG/DL (ref 7–21)
CALCIUM SERPL-MCNC: 9.7 MG/DL (ref 8.4–10.5)
CHLORIDE SERPL-SCNC: 104 MMOL/L (ref 98–107)
CO2 SERPL-SCNC: 29 MMOL/L (ref 21–33)
EOSINOPHIL # BLD: 0.2 10*3/UL (ref 0–0.7)
EOSINOPHIL NFR BLD: 4.9 % (ref 1.5–5)
ERYTHROCYTE [DISTWIDTH] IN BLOOD BY AUTOMATED COUNT: 12.6 % (ref 11.5–14.5)
GLOBULIN SER-MCNC: 3.6 GM/DL
GLUCOSE SERPL-MCNC: 84 MG/DL (ref 70–110)
GRANULOCYTES # BLD: 1.8 10*3/UL (ref 1.4–6.5)
GRANULOCYTES NFR BLD: 43.9 % (ref 50–68)
HCT VFR BLD CALC: 35.5 % (ref 42–52)
LYMPHOCYTES # BLD: 1.7 10*3/UL (ref 1.2–3.4)
LYMPHOCYTES NFR BLD AUTO: 41.5 % (ref 22–35)
MCH RBC QN AUTO: 32.4 PG (ref 25–35)
MCHC RBC AUTO-ENTMCNC: 34.1 G/DL (ref 31–37)
MCV RBC AUTO: 95.2 FL (ref 80–105)
MONOCYTES # BLD AUTO: 0.4 10*3/UL (ref 0.1–0.6)
MONOCYTES NFR BLD: 9.5 % (ref 1–6)
PLATELET # BLD: 213 10^3/UL (ref 120–450)
PMV BLD AUTO: 9.7 FL (ref 7–11)
POTASSIUM SERPL-SCNC: 4.3 MMOL/L (ref 3.6–5)
PROT SERPL-MCNC: 7.2 G/DL (ref 5.8–8.3)
SODIUM SERPL-SCNC: 139 MMOL/L (ref 132–148)
WBC # BLD AUTO: 4.1 10^3/UL (ref 4.5–11)

## 2017-05-14 NOTE — CP.PCM.PN
<Troy Harper - Last Filed: 05/14/17 14:42>





Subjective





- Date & Time of Evaluation


Date of Evaluation: 05/14/17


Time of Evaluation: 08:10





- Subjective


Subjective: 





HOSPITALISTS PROGRESS NOTE





Pt is seen and examined at bedside.  No acute events overnight.  Patient state 

he slept well and had no complaints. He was comfortable and talking to the 

female aid in the room telling stories about his children. The daughter's 

concern of him being aggressive towards females was not observed during this 

interaction. Follow up conversation with the daughter over the phone, she 

stated it sounded like he was at his baseline. She also stated that at his 

baseline he is paranoid that family members with whom he lives with, have taken 

things from him. He denies having any chest pain, SOB, abd pain, N/V/D/C. 





Objective





- Vital Signs/Intake and Output


Vital Signs (last 24 hours): 


 











Temp Pulse Resp BP Pulse Ox


 


 97.5 F L  71   20   135/76   95 


 


 05/14/17 11:51  05/14/17 11:51  05/14/17 11:51  05/14/17 11:51  05/14/17 06:00








Intake and Output: 


 











 05/14/17 05/14/17





 06:59 18:59


 


Intake Total 660 


 


Output Total 1500 


 


Balance -840 














- Medications


Medications: 


 Current Medications





Aspirin (Aspirin Chewable)  81 mg PO DAILY [facility]


   Last Admin: 05/14/17 09:29 Dose:  81 mg


Famotidine (Pepcid)  20 mg PO DAILY [facility]


   Last Admin: 05/14/17 09:29 Dose:  20 mg


Haloperidol (Haldol)  0.25 mg PO Q6 PRN; Protocol


   PRN Reason: Agitation


Hydrochlorothiazide (Microzide)  12.5 mg PO DAILY [facility]


   Last Admin: 05/14/17 09:29 Dose:  12.5 mg


Ondansetron HCl (Zofran Inj)  4 mg IVP Q6 PRN


   PRN Reason: Nausea/Vomiting


Quetiapine Fumarate (Seroquel)  12.5 mg PO HS [facility]


   PRN Reason: Protocol


   Last Admin: 05/13/17 21:34 Dose:  12.5 mg


Tamsulosin HCl (Flomax)  0.4 mg PO DAILY [facility]


   Last Admin: 05/14/17 09:29 Dose:  0.4 mg











- Labs


Labs: 


 





 05/14/17 07:40 





 05/14/17 07:40 





 











PT  10.9 Seconds (9.9-11.8)   05/12/17  07:00    


 


INR  1.01  (0.93-1.08)   05/12/17  07:00    


 


APTT  30.1 Seconds (23.7-30.8)   05/12/17  07:00    














- Constitutional


Appears: Non-toxic, No Acute Distress





- Head Exam


Head Exam: ATRAUMATIC, NORMOCEPHALIC





- Eye Exam


Additional comments: 





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





- ENT Exam


ENT Exam: Mucous Membranes Moist





- Neck Exam


Neck Exam: Full ROM.  absent: Lymphadenopathy, Thyromegaly





- Respiratory Exam


Respiratory Exam: Clear to Ausculation Bilateral, NORMAL BREATHING PATTERN





- Cardiovascular Exam


Cardiovascular Exam: REGULAR RHYTHM, +S1, +S2.  absent: JVD





- GI/Abdominal Exam


GI & Abdominal Exam: Soft, Normal Bowel Sounds.  absent: Tenderness





- Extremities Exam


Extremities Exam: absent: Joint Swelling, Pedal Edema





- Back Exam


Back Exam: tenderness (lumbar region).  absent: rash noted





- Neurological Exam


Neurological Exam: Alert, Awake





- Psychiatric Exam


Psychiatric exam: Normal Affect, Normal Mood





- Skin


Skin Exam: Dry, Intact, Normal Color, Warm





Assessment and Plan





- Assessment and Plan (Free Text)


Assessment: 





86yo M with PMHx of HTN, Colon CA s/p chemo and resection, L Eye CA s/p 

resection here for evaluation of SOB and CP.





Plan: 





1. Chest pain


Cardiology, Dr. Morrissey, is consulted.  appreciate recs


   -Cardiac Stress Test - please see full report


   -Probably Abnormal SPECT study


   -Fixed defect Ant & Ant Lat


   -ECHO EF 60% - please see full report


TSH, lipid panel and HgbA1c are WNL


d/c tele, transfer to med/surg


ASA 81 daily





2. AMS x 2 weeks


f/u with daughter to see if pt. is at baseline


   -per daughter, pt. is most likely at baseline, will be in the hospital on 

Monday


CT of head without contrast - please see full report


   -No Acute Intracranial Hemorrhage





Psych, Dr. Vinson is consulted


   -agrees with Neuro to start Haldol PRN


   -might still be presenting with symptoms of delerium, cont 1:1


   -will continue to follow


Neurology, Dr. Camp is consulted. 


   -agitation likely his congnitive impairment with behavioral disturbance


   -Seroquel 12.5mg PO HS


   -rec on d/c 


      -coenzyme Q10 400mg PO daily


      -alpha lipoic acid 600mg po daily


      -Namenda XR 14mg po daily


   -cont current mgmt - sign off





3. Lower extremity edema


LE doppler US is negative for DVT


continue to monitor


Strict I&Os


Fall precautions





4. Hx of HTN


Patient is switched to HCTZ 12.5 mg po qd.  Stopped norvasc due to LE swelling.

  





5. PPx


SCDs


Famotidine 20mg PO Daily





Discussed case with attending Dr. Vega








<Ivonne Vega - Last Filed: 05/22/17 13:27>





Objective





- Vital Signs/Intake and Output


Vital Signs (last 24 hours): 


 











Temp Pulse Resp BP Pulse Ox


 


 97.9 F   71   20   136/73   95 


 


 05/14/17 17:02  05/14/17 17:02  05/14/17 17:02  05/14/17 17:02  05/14/17 06:00











- Labs


Labs: 


 





 05/15/17 09:45 





 05/15/17 09:45 





 











PT  10.9 Seconds (9.9-11.8)   05/12/17  07:00    


 


INR  1.01  (0.93-1.08)   05/12/17  07:00    


 


APTT  30.1 Seconds (23.7-30.8)   05/12/17  07:00    














Attending/Attestation





- Attestation


I have personally seen and examined this patient.: Yes


I have fully participated in the care of the patient.: Yes


I have reviewed all pertinent clinical information, including history, physical 

exam and plan: Yes


Notes (Text): 


I have seen and examined patient with the resident. Agree with the note above 

with the following additions/ exceptions: This is a 87 year old male with 

history of HTN, former smoker, colon cancer s/p resection & chemo, left eye 

cancer s/p resection who got admitted for evaluation of chest pain which is 

most likely musculoskeletal in origin. Serial troponin and ekg was within 

normal limits. Cardiology evaluation with Dr. Morrissey appreciated. Stress test 

showed fixed defect.


As per patients daughter, patient has been having periods of confusion and 

aggression toward females. Neuro and psych on board. Patient has mild cognitive 

impairment. Recommended coenzyme q10, alpha lipoic acid and namenda. Psych 

recommended to keep the patient overnight to assess symptoms of delirium. 

Called the daughter who advised us that she will not be able to come to the 

hospital today to assess whether patient is at baseline or not. CT head is 

negative.


Patient is currently alert, awake and oriented.


LE swelling has improved. LE duplex is negative.


Upon discharge patient will follow up with Dr Hedrick. 


Dr Ivonne Vega

## 2017-05-15 LAB
ADD MANUAL DIFF?: NO
ALBUMIN/GLOB SERPL: 1 {RATIO} (ref 1.1–1.8)
ALP SERPL-CCNC: 40 U/L (ref 38–133)
ALT SERPL-CCNC: 33 U/L (ref 7–56)
AST SERPL-CCNC: 18 U/L (ref 15–59)
BASOPHILS # BLD AUTO: 0.02 K/MM3 (ref 0–2)
BASOPHILS NFR BLD: 0.5 % (ref 0–3)
BILIRUB SERPL-MCNC: 0.9 MG/DL (ref 0.2–1.3)
BUN SERPL-MCNC: 20 MG/DL (ref 7–21)
CALCIUM SERPL-MCNC: 9.5 MG/DL (ref 8.4–10.5)
CHLORIDE SERPL-SCNC: 101 MMOL/L (ref 98–107)
CO2 SERPL-SCNC: 28 MMOL/L (ref 21–33)
EOSINOPHIL # BLD: 0.2 10*3/UL (ref 0–0.7)
EOSINOPHIL NFR BLD: 5.7 % (ref 1.5–5)
ERYTHROCYTE [DISTWIDTH] IN BLOOD BY AUTOMATED COUNT: 12.8 % (ref 11.5–14.5)
GLOBULIN SER-MCNC: 3.4 GM/DL
GLUCOSE SERPL-MCNC: 108 MG/DL (ref 70–110)
GRANULOCYTES # BLD: 1.82 10*3/UL (ref 1.4–6.5)
GRANULOCYTES NFR BLD: 44.6 % (ref 50–68)
HCT VFR BLD CALC: 34.2 % (ref 42–52)
LYMPHOCYTES # BLD: 1.6 10*3/UL (ref 1.2–3.4)
LYMPHOCYTES NFR BLD AUTO: 39.1 % (ref 22–35)
MCH RBC QN AUTO: 32.5 PG (ref 25–35)
MCHC RBC AUTO-ENTMCNC: 33.9 G/DL (ref 31–37)
MCV RBC AUTO: 95.8 FL (ref 80–105)
MONOCYTES # BLD AUTO: 0.4 10*3/UL (ref 0.1–0.6)
MONOCYTES NFR BLD: 10.1 % (ref 1–6)
PLATELET # BLD: 220 10^3/UL (ref 120–450)
PMV BLD AUTO: 9.6 FL (ref 7–11)
POTASSIUM SERPL-SCNC: 3.9 MMOL/L (ref 3.6–5)
PROT SERPL-MCNC: 6.9 G/DL (ref 5.8–8.3)
SODIUM SERPL-SCNC: 137 MMOL/L (ref 132–148)
WBC # BLD AUTO: 4.1 10^3/UL (ref 4.5–11)

## 2017-05-15 NOTE — PN
DATE: 05/15/2017



Shortly, patient is an 87-year-old -American male with not known psychiatric history.  The hong
ient was admitted on the medical side for evaluation of change in mental status for the past 2 weeks.
  Psych consult was called for evaluation of "being difficult in the house."  As per Dr. Vinson, simon lantigua also was more aggressive towards women as well as more clumsy at home.  The patient was seen by Dr Sabrina Vinson over the weekend.  This writer reviewed previous notes.  Labs reviewed.  The patient was eval
uated.  As per Dr. Vinson's recommendations, it was recommended that family should be involved and col
lateral information from the patient's family needs to be obtained.  For example, what kind of baseli
ne level of functioning patient had before, also if any aggression or agitation towards any family me
mbers, as well as if any difficulty to manage finances, being lost in the community as well as any dr
ugs involved into the patient's life as well as if patient's mental status is improved over the weeke
nd.  The patient was seen and examined.  The patient presented to be alert, pleasant and superficiall
y cooperative.  The patient's thought process seems to be illogical.  The patient was telling that hi
s daughter-in-law had open heart surgery.  The patient's daughter also had open heart surgery.  When 
this writer asked if anybody else out had open heart surgery, patient said only 2 of them.  The simon
nt also has illogical thought process, was rambling, but at the same time, could be directed.  The pa
tient currently is on 1:1.  As per 1:1, there is no agitation, no aggression.  The patient has fair a
ppetite.  No psychotic symptoms.



VITAL SIGNS:  Reviewed.  Temperature 97.9, pulse is 71, blood pressure 136/73, respirations 20, oxyge
n saturation is 95%.



MEDICATIONS:  Reviewed.  The patient is on aspirin, Pepcid, Haldol 0.25 mg p.o. q. 6 hours as needed 
for agitation.  The patient was not agitated and not even 1 dose was given to the patient.  The simon
nt is on Microzide 12.5 mg daily, Zofran, Seroquel 12.5 mg at the nighttime by medical team.  The hong
ient is on Flomax as well.



LABORATORIES:  Reviewed.  WBC cells 4.0, hemoglobin 11.6, hematocrit 34.2, absolute neutrophil count 
within normal limits.  Coagulation within normal limits.  Chemistry seems to be within normal limits.




As per medical team note, patient was visited by his daughter and yesterday, patient deemed to be at 
his baseline.



MENTAL STATUS EXAMINATION:  The patient appears to be alert.  The patient knows that he is in [facility], but is not aware of the circumstances of his admission on the medical side.  The patient i
s legally blind.  Speech was rambling as well as low volume.  Thought process is circumstantial.  Tho
ught content:  The patient denied visual, auditory, tactile hallucinations, denied paranoid ideation,
 denied thoughts of killing himself, denied thoughts of killing others.  Mood described, "I feel fine
."  Affect expanded, at times patient smiles inappropriately.  Insight and judgment improving.  Impul
ses are well controlled.



IMPRESSION:  Change in mental status for the past 2 weeks, could be related to the medical condition 
and based on history.  Over the weekend, patient's daughter said that most likely patient is at his b
aseline.  Mild cognitive deficit cannot be excluded or early stage of dementia cannot be excluded eit
her.  Dr. Vinson had impression that patient was delirious.  This writer cannot exclude this diagnosis
 as well.  As per medical team, patient had chest pain.  Cardiologist was involved as well as altered
 mental status.  CT scan of the head was within normal limits.  The patient was seen by Dr. Camp.  
The patient was started on Namenda and neurologist signed off.  The patient also has lower extremity 
edema as well as history of hypertension.



PLAN:  As per medical team, daughter confirmed that patient is at his baseline and if patient deemed 
ready for discharge from the medical standpoint, there is no point to transfer patient to the psychia
tric inpatient unit because patient is not depressed.  The patient is not suicidal.  The patient is n
ot homicidal.  No psychotic symptoms observed or reported and patient deemed to be at his baseline.  
On top of that, patient was started on Seroquel by neurology and there is no objection to continue th
at.  Namenda should be continued.  Coenzyme Q10 should be continued.  From this writer's perspective,
 patient does not meet the criteria to go to the psychiatric inpatient unit.  The patient is pleasant
 and cooperative.  No behavioral disturbances.  This writer will sign off.  Social work evaluation re
commended.  Please advise family to initiate power of  as well as advanced directives in the 
future.  Should have any questions, give me a call back.



Thank you very much for letting me participate in care of your patient.





__________________________________________

Monie Mcbride MD







cc:



DD: 05/15/2017 10:38:04  [id number]

TT: 05/15/2017 11:07:17

Confirmation # [id number]

Dictation # [id number]

en

## 2017-05-15 NOTE — CON
DATE: 05/14/2017



The patient is an 87-year-old  male with no formal psychiatric history who was consul
abhay by medical team to psychiatry due to the patient's change in mental status for the last 2 weeks. 
 Social work notes indicate that daughter reported that the patient had been more difficult to direct
 and more aggressive towards women, as well as more clumsy at home.  



I met with the patient at bedside yesterday and today, and he continues to be generally pleasant and 
cooperative with my questioning.  Please note the dictated note from 5/13/2017 indicated that the pat
elías can be "brutal."  This is not what this provider dictated.  He is humorous.  He makes frequent j
okes, and he is hard of hearing, which requires me to question him repeatedly to have a productive co
nversation with him.  He can be a little forgetful during the course of the interview, but generally,
 consistent regarding his self-evaluation of wellbeing.  He indicates that he is generally feeling ok
ay and hopeful, but had been enjoying life, and has no thoughts of wanting to die or harm himself.  H
e denied having any hallucinations, and delusions were not elicited.



I reviewed recent staff notes that indicated that the patient was agitated last night and did not wan
t to move rooms, and had refused to be moved from his current patient room.  I discussed this inciden
t with the patient this morning, and the patient indicates that he was given a choice of moving and n
ot moving, and he made a choice to not move.  The patient indicates "I wasn't fussing - that's the go
spel."  The patient consistently indicates that he was not trying to be difficult last night, and it 
is unclear to this provider what actually transpired during this incident.  The patient has a tendenc
y to be rambling, but can be redirected.  The patient reports that he slept well, denies any side eff
ects with the medication like in Seroquel that was started by neurology yesterday.



Of question is whether the patient can be discharged home.  It is unclear whether the patient  can ca
re for himself since the request for the consult was vague - indicated that the patient has only been
 difficult to direct and has been a little bit more aggressive with the family members.  It has been 
indicated that the patient did show a change in mental status and behavior in the last 2 weeks, which
 seems consistent with delirium, as such an acute change in mental status is not associated with morgan
ntia, as dementia has more insidious onset.  I cannot rule out dementia and age-related cognitive dec
line contributing to the patient's presentation of forgetfulness at times.  



During our conversation today, the patient is quite aware of the necessity to groom himself, to take 
his medications correctly, to elicit medical care when needed, and he also was aware of the importanc
e of finances.  The patient was oriented, and he recalled me from my visit yesterday, and he also wis
hed me a happy mother's day, as he recalled that I was currently pregnant.  



His insight and judgment are considered to be fair at this time.  However, again, it is unclear wheth
er he is still suffering from delirium, as this can have a waxing and waning presentation.



VITAL SIGNS AND LABORATORIES:  Reviewed.



CURRENT RELEVANT PSYCHIATRIC MEDICATIONS:  Include Haldol 0.25 mg p.o. q. 6 p.r.n. of which the simon
nt did not receive any doses yesterday, as well as Seroquel 12.5 mg p.o. at bedtime, which the karsten
t tolerated well last night.



IMPRESSION:  As noted in my summary above, the patient may be suffering from delirium in context of b
ackground dementia and age-related cognitive decline.  Delirium appears to be the intuitive etiology 
for the patient's change in behavior, as this can present suddenly as opposed to dementia, which pres
ents insidiously.  The patient also reports that he has had difficulty "holding his urine" recently, 
and you cannot rule out the presence of urinary tract infection contributing to his presentation.



RECOMMENDATION:  At this time, I would recommend that medical team meet with social work and the yael
ent's daughter to fully elucidate the patient's functioning and provide more information to determine
 whether he can be discharged and what the daughter's specific concerns were.  Specifically, has the 
patient been threatening family members, has the patient been physically threatening towards family m
embers, has the patient been able to groom himself, handle his finances.  Has family been observing t
he patient to hallucinate or talk to himself or be illogical.  What was the  specific pattern of beha
viors that changed in the last 2 weeks, and specifically, does the daughter feel that this has improv
ed, which would indicate a complete resolution of the patient's delirious state ____.  Until this is 
done, it is very difficult for psychiatry to determine whether the patient is safe for discharge sin
e the reason for the initial consult was vague to begin with.  Psychiatry will continue to follow up 
with the patient and medical team to obtain this collateral, and we are awaiting this collateral info
rmation.





__________________________________________

Balaji Vinson MD







cc:



DD: 05/14/2017 09:38:26  [id number]

TT: 05/14/2017 10:44:31

Confirmation # [id number]

Dictation # [id number]

tammy

## 2017-05-15 NOTE — PN
DATE: 05/15/2017



The patient in room 561, bed 1.



REASON FOR CONSULTATION:  Chest pain and shortness of breath.



HISTORY OF PRESENT ILLNESS:  The patient an 87-year-old male, legally blind, admitted with a history 
that he got episode of sharp chest pain followed by shortness of breath.  He does get shortness of br
eath on exertion previously also but denied having any chest pain in the past on exertion.  The patie
nt with known hypertension and has status post resection for CA colon, also history of eye cancer sta
tus post left eye resection.  The patient lying flat in bed without chest pain, shortness of breath, 
or palpitation.  The patient's stress test on 05/12/2017 showed fixed defect without any ischemia and
 normal LV ejection fraction of 60%.  Stress test was done on 5/12/2017.  Same day patient also had e
chocardiogram, which showed normal LV, mild concentric LVH, EF 60% to 65%, trace aortic regurgitation
, mild mitral regurgitation, mild tricuspid regurgitation, RVSP 29 mmHg, trace pulmonic regurg.  The 
patient now symptom free, lying flat in bed without any complaints.



PHYSICAL EXAMINATION:

VITAL SIGNS:  Blood pressure 136/73, respirations 20, pulse 71, temperature 97.9.

HEAD:  Normocephalic.

EYES:  Pupils normal.  Conjunctivae slightly pale.

NECK:  JVP low.  Carotid equal.

THORAX:  AP diameter normal.

LUNGS:  Clear.

CARDIOVASCULAR:  S1, S2.

ABDOMEN:  Soft, no tenderness, no organomegaly.  Bowel sounds normal.

EXTREMITIES:  No clubbing, no cyanosis.



LABORATORY DATA:  WBC 4.1, hemoglobin 11.6, hematocrit 34.2, platelet 220.  Sodium 137, potassium 3.9
, BUN 20, creatinine 1.3.  AST, ALT normal.  Total protein and albumin normal.



DIAGNOSES:  Chest pain, shortness of breath on exertion, hypertension, legally blind.  Negative stres
s test.  Echo, no significant abnormality.



PLAN:  The patient on aspirin 81 mg daily, Flomax 0.4 daily, hydrochlorothiazide 12.5 mg p.o. daily, 
Pepcid 20 mg daily.  We will continue present therapy.  We will follow with you.





__________________________________________

Guille Garcia MD







cc:



DD: 05/15/2017 13:08:32  [id number]

TT: 05/15/2017 13:35:38

Confirmation # [id number]

Dictation # [id number]

sn

## 2017-05-15 NOTE — CP.PCM.DIS
<Danyelle Kimbrough - Last Filed: 05/15/17 14:44>





Provider





- Provider


Date of Admission: 


05/12/17 17:03





Attending physician: 


Priscila Pedro MD





Primary care physician: 


Haseeb Matos MD





Consults: 





Cardio: Ghanshyam


Neuro: Zoë


Psych: Dr. Vinson 





Time Spent in preparation of Discharge (in minutes): 45





Hospital Course





- Lab Results


Lab Results: 


 Most Recent Lab Values











WBC  4.1 10^3/ul (4.5-11.0)  L  05/15/17  09:45    


 


RBC  3.57 10^6/uL (3.5-6.1)   05/15/17  09:45    


 


Hgb  11.6 gm/dL (14.0-18.0)  L  05/15/17  09:45    


 


Hct  34.2 % (42.0-52.0)  L  05/15/17  09:45    


 


MCV  95.8 fL (80.0-105.0)   05/15/17  09:45    


 


MCH  32.5 pg (25.0-35.0)   05/15/17  09:45    


 


MCHC  33.9 g/dl (31.0-37.0)   05/15/17  09:45    


 


RDW  12.8 % (11.5-14.5)   05/15/17  09:45    


 


Plt Count  220 10^3/uL (120.0-450.0)   05/15/17  09:45    


 


MPV  9.6 fl (7.0-11.0)   05/15/17  09:45    


 


Gran %  44.6 % (50.0-68.0)  L  05/15/17  09:45    


 


Lymph % (Auto)  39.1 % (22.0-35.0)  H  05/15/17  09:45    


 


Mono % (Auto)  10.1 % (1.0-6.0)  H  05/15/17  09:45    


 


Eos % (Auto)  5.7 % (1.5-5.0)  H  05/15/17  09:45    


 


Baso % (Auto)  0.5 % (0.0-3.0)   05/15/17  09:45    


 


Gran #  1.82  (1.4-6.5)   05/15/17  09:45    


 


Lymph #  1.6  (1.2-3.4)   05/15/17  09:45    


 


Mono #  0.4  (0.1-0.6)   05/15/17  09:45    


 


Eos #  0.2  (0.0-0.7)   05/15/17  09:45    


 


Baso #  0.02 K/mm3 (0.0-2.0)   05/15/17  09:45    


 


PT  10.9 Seconds (9.9-11.8)   05/12/17  07:00    


 


INR  1.01  (0.93-1.08)   05/12/17  07:00    


 


APTT  30.1 Seconds (23.7-30.8)   05/12/17  07:00    


 


Sodium  137 mmol/L (132-148)   05/15/17  09:45    


 


Potassium  3.9 mmol/L (3.6-5.0)   05/15/17  09:45    


 


Chloride  101 mmol/L ()   05/15/17  09:45    


 


Carbon Dioxide  28 mmol/L (21-33)   05/15/17  09:45    


 


Anion Gap  12  (10-20)   05/15/17  09:45    


 


BUN  20 mg/dL (7-21)   05/15/17  09:45    


 


Creatinine  1.3 mg/dL (0.5-1.4)   05/15/17  09:45    


 


Est GFR ( Amer)  > 60   05/15/17  09:45    


 


Est GFR (Non-Af Amer)  52   05/15/17  09:45    


 


Random Glucose  108 mg/dL ()   05/15/17  09:45    


 


Hemoglobin A1c  4.7 % (4.2-6.5)   05/12/17  07:00    


 


Calcium  9.5 mg/dL (8.4-10.5)   05/15/17  09:45    


 


Total Bilirubin  0.9 mg/dL (0.2-1.3)   05/15/17  09:45    


 


AST  18 U/L (15-59)   05/15/17  09:45    


 


ALT  33 U/L (7-56)   05/15/17  09:45    


 


Alkaline Phosphatase  40 U/L ()   05/15/17  09:45    


 


Lactate Dehydrogenase  417 U/L (333-699)   05/11/17  16:17    


 


Total Creatine Kinase  76 U/L ()   05/11/17  16:17    


 


Troponin I  < 0.01 ng/mL  05/12/17  12:44    


 


NT-Pro-B Natriuret Pep  59.7 pg/mL (0-450)   05/11/17  16:17    


 


Total Protein  6.9 g/dL (5.8-8.3)   05/15/17  09:45    


 


Albumin  3.5 g/dL (3.0-4.8)   05/15/17  09:45    


 


Globulin  3.4 gm/dL  05/15/17  09:45    


 


Albumin/Globulin Ratio  1.0  (1.1-1.8)  L  05/15/17  09:45    


 


Triglycerides  82 mg/dL ()   05/12/17  07:00    


 


Cholesterol  199 mg/dL (130-200)   05/12/17  07:00    


 


LDL Cholesterol Direct  118 mg/dL (0-129)   05/12/17  07:00    


 


HDL Cholesterol  48 mg/dL (29-60)   05/12/17  07:00    


 


TSH 3rd Generation  0.83 mIU/mL (0.46-4.68)   05/12/17  07:00    














- Hospital Course


Hospital Course: 


86yo M with PMHx of HTN, Colon CA s/p resection, Eye CA s/p L eye resection 

here for evaluation of chest pain. Patient was at home when he was lifting a 

heavy object and experienced a left sided chest and shoulder pain with 

shortness of breath. Pain lasted about 20mins and has since resolved. Pain 

radiated to the left shoulder. At its worst, pain was 7/10. He has never 

experienced a pain such as this before. No exacerbating or remitting factors. 

He states that he has been increasingly more short of breath over the past 

week. He also noticed bilateral lower extremity edema but is unsure when it 

started. He is ambulatory with cane and assistance with home health aide. 

Denies diaphoresis. Denies N/V/D. No Abd pain. No F/C. No Headache. No urinary 

complaints.





On admission, CXR was negative for acute disease.  Troponins x 3 were also 

negative and EKG was unremarkable.  Cardiology was consulted and recommended 

echo. Echo showed EF of 60-65% with mild LVH (see full report).  Patient also 

had a stress test which showed mild, fixed anterior defect suggestive of 

myocardial injury. Cardiology recommended outpatient follow up for this.  

Patient also had lower extremity swelling on presentation.  LE doppler US were 

negative for DVT.  During hospital stay, patient's daughters stated that 

patient is not at baseline mentally and he has been more clumsy lately.  CT of 

head was done which showed no acute changes.  Neurology was consulted and 

recommended continuing physical therapy and recommended Coq10 400 mg po qd, 

Namenda xr 14 mg po qd, and seroquel 12.5 mg po qd.  Psych was also consulted 

and stated that since patient is A&Ox3 and not depressed patient is cleared for 

discharge.  Psych also recommended continuing seroquel prn at home.   





Patient is to follow up with PMD upon discharge.


Patient is to discontinue home medication Norvasc due to lower extremity 

swelling.  


Patient is discharged on the following medications: Hydrochlorothiazide 12.5 mg 

po qd #30. Continue home medication Flomax. Patient is also started on the 

following medications: Namenda XR 14 mg PO QD, Coenzyme q10 400 mg po QD, 

Seroquel 12.5 mg po HS.  Medication scripts are sent to [facility] in 

[facility]. 





Please see EMR for full details.  





- Date & Time of H&P


Date of H&P: 05/15/17


Time of H&P: 13:54





Discharge Exam





- Head Exam


Head Exam: ATRAUMATIC, NORMOCEPHALIC





- ENT Exam


ENT Exam: Mucous Membranes Moist





- Respiratory Exam


Respiratory Exam: Clear to PA & Lateral, NORMAL BREATHING PATTERN.  absent: 

Rales, Rhonchi, Wheezes





- Cardiovascular Exam


Cardiovascular Exam: REGULAR RHYTHM, +S1, +S2.  absent: Diastolic murmur, Gallop

, Rubs, Systolic Murmur





- GI/Abdominal Exam


GI & Abdominal Exam: Normal Bowel Sounds, Soft, Unremarkable.  absent: Distended

, Firm, Guarding, Rigid, Tenderness





- Extremities Exam


Additional comments: 





no edema or tenderness 





- Neurological Exam


Neurological exam: Alert, Oriented x3





- Psychiatric Exam


Psychiatric exam: Normal Affect, Normal Mood





- Skin


Skin Exam: Dry, Intact, Normal Color, Warm





Discharge Plan





- Discharge Medications


Prescriptions: 


Famotidine [Pepcid] 20 mg PO DAILY #30 tab


Memantine HCl [Namenda Xr] 14 mg PO DAILY #30 cap.spr.24


QUEtiapine [Seroquel] 12.5 mg PO HS PRN #30 tab


 PRN Reason: Agitation


Tamsulosin [Flomax] 0.4 mg PO DAILY #30 cap


Ubidecarenone [Coenzyme Q10] 400 mg PO DAILY #30 capsule





- Follow Up Plan


Condition: FAIR


Disposition: HOME/ ROUTINE


Instructions:  Chest Pain (DC), Chronic Hypertension (DC), Low Sodium Diet (DC)


Additional Instructions: 


Patient is to follow up with PMD upon discharge.


Patient is to follow up with cardiologist, Dr. Morrissey upon discharge.  


Patient is to discontinue home medication Norvasc due to lower extremity 

swelling.  


Patient is discharged on the following medications: Hydrochlorothiazide 12.5 mg 

po qd #30. Continue home medication Flomax. Patient is also started on the 

following medications: Namenda XR 14 mg PO QD, Coenzyme q10 400 mg po QD, 

Seroquel 12.5 mg po HS.  Medication scripts are sent to [facility] in 

[facility].   


Referrals: 


Guille Morrissey MD [Staff Provider] - 


Sherman Camp MD [Medical Doctor] - 


Haseeb Matos MD [Primary Care Provider] - 





<Priscila Pedro - Last Filed: 05/16/17 15:21>





Provider





- Provider


Date of Admission: 


05/12/17 17:03





Attending physician: 


Priscila Pedro MD





Primary care physician: 


Haseeb Matos MD








Hospital Course





- Lab Results


Lab Results: 


 Most Recent Lab Values











WBC  4.1 10^3/ul (4.5-11.0)  L  05/15/17  09:45    


 


RBC  3.57 10^6/uL (3.5-6.1)   05/15/17  09:45    


 


Hgb  11.6 gm/dL (14.0-18.0)  L  05/15/17  09:45    


 


Hct  34.2 % (42.0-52.0)  L  05/15/17  09:45    


 


MCV  95.8 fL (80.0-105.0)   05/15/17  09:45    


 


MCH  32.5 pg (25.0-35.0)   05/15/17  09:45    


 


MCHC  33.9 g/dl (31.0-37.0)   05/15/17  09:45    


 


RDW  12.8 % (11.5-14.5)   05/15/17  09:45    


 


Plt Count  220 10^3/uL (120.0-450.0)   05/15/17  09:45    


 


MPV  9.6 fl (7.0-11.0)   05/15/17  09:45    


 


Gran %  44.6 % (50.0-68.0)  L  05/15/17  09:45    


 


Lymph % (Auto)  39.1 % (22.0-35.0)  H  05/15/17  09:45    


 


Mono % (Auto)  10.1 % (1.0-6.0)  H  05/15/17  09:45    


 


Eos % (Auto)  5.7 % (1.5-5.0)  H  05/15/17  09:45    


 


Baso % (Auto)  0.5 % (0.0-3.0)   05/15/17  09:45    


 


Gran #  1.82  (1.4-6.5)   05/15/17  09:45    


 


Lymph #  1.6  (1.2-3.4)   05/15/17  09:45    


 


Mono #  0.4  (0.1-0.6)   05/15/17  09:45    


 


Eos #  0.2  (0.0-0.7)   05/15/17  09:45    


 


Baso #  0.02 K/mm3 (0.0-2.0)   05/15/17  09:45    


 


PT  10.9 Seconds (9.9-11.8)   05/12/17  07:00    


 


INR  1.01  (0.93-1.08)   05/12/17  07:00    


 


APTT  30.1 Seconds (23.7-30.8)   05/12/17  07:00    


 


Sodium  137 mmol/L (132-148)   05/15/17  09:45    


 


Potassium  3.9 mmol/L (3.6-5.0)   05/15/17  09:45    


 


Chloride  101 mmol/L ()   05/15/17  09:45    


 


Carbon Dioxide  28 mmol/L (21-33)   05/15/17  09:45    


 


Anion Gap  12  (10-20)   05/15/17  09:45    


 


BUN  20 mg/dL (7-21)   05/15/17  09:45    


 


Creatinine  1.3 mg/dL (0.5-1.4)   05/15/17  09:45    


 


Est GFR ( Amer)  > 60   05/15/17  09:45    


 


Est GFR (Non-Af Amer)  52   05/15/17  09:45    


 


Random Glucose  108 mg/dL ()   05/15/17  09:45    


 


Hemoglobin A1c  4.7 % (4.2-6.5)   05/12/17  07:00    


 


Calcium  9.5 mg/dL (8.4-10.5)   05/15/17  09:45    


 


Total Bilirubin  0.9 mg/dL (0.2-1.3)   05/15/17  09:45    


 


AST  18 U/L (15-59)   05/15/17  09:45    


 


ALT  33 U/L (7-56)   05/15/17  09:45    


 


Alkaline Phosphatase  40 U/L ()   05/15/17  09:45    


 


Lactate Dehydrogenase  417 U/L (333-699)   05/11/17  16:17    


 


Total Creatine Kinase  76 U/L ()   05/11/17  16:17    


 


Troponin I  < 0.01 ng/mL  05/12/17  12:44    


 


NT-Pro-B Natriuret Pep  59.7 pg/mL (0-450)   05/11/17  16:17    


 


Total Protein  6.9 g/dL (5.8-8.3)   05/15/17  09:45    


 


Albumin  3.5 g/dL (3.0-4.8)   05/15/17  09:45    


 


Globulin  3.4 gm/dL  05/15/17  09:45    


 


Albumin/Globulin Ratio  1.0  (1.1-1.8)  L  05/15/17  09:45    


 


Triglycerides  82 mg/dL ()   05/12/17  07:00    


 


Cholesterol  199 mg/dL (130-200)   05/12/17  07:00    


 


LDL Cholesterol Direct  118 mg/dL (0-129)   05/12/17  07:00    


 


HDL Cholesterol  48 mg/dL (29-60)   05/12/17  07:00    


 


TSH 3rd Generation  0.83 mIU/mL (0.46-4.68)   05/12/17  07:00    














Attending/Attestation





- Attestation


I have personally seen and examined this patient.: Yes


I have fully participated in the care of the patient.: Yes


I have reviewed all pertinent clinical information, including history, physical 

exam and plan: Yes


Notes (Text): 





05/16/17 15:13


attending note;





I have seen and examined this patient at bedside. 





This is a 87 year old male with history of HTN, former smoker, colon cancer s/p 

resection & chemo, left eye cancer s/p resection who got admitted for 

evaluation of chest pain which is most likely musculoskeletal in origin.


Serial troponin and ekg was within normal limits. Cardiology evaluation with 

Dr. Morrissey appreciated.


Stress test showed fixed defect.


Episodes of confusion; neurology and psychiatry evaluation appreciated. CT head 

is negative.


Patient is currently alert, awake and oriented.


LE swelling has improved. LE duplex is negative.





Upon discharge patient will follow up with Dr Hedrick. 





Diagnosis;


Hypertension


Hearing-impaired


Left eye blindness

## 2017-10-30 ENCOUNTER — HOSPITAL ENCOUNTER (INPATIENT)
Dept: HOSPITAL 42 - ED | Age: 82
LOS: 3 days | Discharge: HOME | DRG: 192 | End: 2017-11-02
Attending: INTERNAL MEDICINE | Admitting: INTERNAL MEDICINE
Payer: MEDICARE

## 2017-10-30 VITALS — BODY MASS INDEX: 34.4 KG/M2

## 2017-10-30 DIAGNOSIS — J44.1: Primary | ICD-10-CM

## 2017-10-30 DIAGNOSIS — I45.10: ICD-10-CM

## 2017-10-30 DIAGNOSIS — Z85.038: ICD-10-CM

## 2017-10-30 DIAGNOSIS — Z87.891: ICD-10-CM

## 2017-10-30 DIAGNOSIS — K59.00: ICD-10-CM

## 2017-10-30 DIAGNOSIS — H91.90: ICD-10-CM

## 2017-10-30 DIAGNOSIS — N40.0: ICD-10-CM

## 2017-10-30 DIAGNOSIS — D64.9: ICD-10-CM

## 2017-10-30 DIAGNOSIS — J44.0: ICD-10-CM

## 2017-10-30 DIAGNOSIS — F10.10: ICD-10-CM

## 2017-10-30 DIAGNOSIS — H40.9: ICD-10-CM

## 2017-10-30 DIAGNOSIS — J20.9: ICD-10-CM

## 2017-10-30 DIAGNOSIS — I10: ICD-10-CM

## 2017-10-30 DIAGNOSIS — Z85.840: ICD-10-CM

## 2017-10-30 LAB
ALBUMIN/GLOB SERPL: 1.2 {RATIO} (ref 1.1–1.8)
ALBUMIN/GLOB SERPL: 1.3 {RATIO} (ref 1.1–1.8)
ALP SERPL-CCNC: 41 U/L (ref 38–126)
ALP SERPL-CCNC: 50 U/L (ref 38–126)
ALT SERPL-CCNC: 31 U/L (ref 7–56)
ALT SERPL-CCNC: 34 U/L (ref 7–56)
APTT BLD: 32.9 SECONDS (ref 25.1–36.5)
AST SERPL-CCNC: 31 U/L (ref 17–59)
AST SERPL-CCNC: 36 U/L (ref 17–59)
BASOPHILS # BLD AUTO: 0.01 K/MM3 (ref 0–2)
BASOPHILS # BLD AUTO: 0.01 K/MM3 (ref 0–2)
BASOPHILS NFR BLD: 0.2 % (ref 0–3)
BASOPHILS NFR BLD: 0.3 % (ref 0–3)
BILIRUB SERPL-MCNC: 0.7 MG/DL (ref 0.2–1.3)
BILIRUB SERPL-MCNC: 0.7 MG/DL (ref 0.2–1.3)
BUN SERPL-MCNC: 19 MG/DL (ref 7–21)
BUN SERPL-MCNC: 19 MG/DL (ref 7–21)
CALCIUM SERPL-MCNC: 10.1 MG/DL (ref 8.4–10.5)
CALCIUM SERPL-MCNC: 9.9 MG/DL (ref 8.4–10.5)
CHLORIDE SERPL-SCNC: 105 MMOL/L (ref 98–107)
CHLORIDE SERPL-SCNC: 107 MMOL/L (ref 98–107)
CO2 SERPL-SCNC: 29 MMOL/L (ref 21–33)
CO2 SERPL-SCNC: 32 MMOL/L (ref 21–33)
D DIMER PPP FEU-MCNC: 292 NG/ML (ref 0–243)
EOSINOPHIL # BLD: 0.1 10*3/UL (ref 0–0.7)
EOSINOPHIL # BLD: 0.1 10*3/UL (ref 0–0.7)
EOSINOPHIL NFR BLD: 1.9 % (ref 1.5–5)
EOSINOPHIL NFR BLD: 2 % (ref 1.5–5)
ERYTHROCYTE [DISTWIDTH] IN BLOOD BY AUTOMATED COUNT: 12.7 % (ref 11.5–14.5)
ERYTHROCYTE [DISTWIDTH] IN BLOOD BY AUTOMATED COUNT: 12.7 % (ref 11.5–14.5)
GLOBULIN SER-MCNC: 3 GM/DL
GLOBULIN SER-MCNC: 3.2 GM/DL
GLUCOSE SERPL-MCNC: 89 MG/DL (ref 70–110)
GLUCOSE SERPL-MCNC: 92 MG/DL (ref 70–110)
GRANULOCYTES # BLD: 2.09 10*3/UL (ref 1.4–6.5)
GRANULOCYTES # BLD: 2.14 10*3/UL (ref 1.4–6.5)
GRANULOCYTES NFR BLD: 50 % (ref 50–68)
GRANULOCYTES NFR BLD: 54.6 % (ref 50–68)
HCT VFR BLD CALC: 35.9 % (ref 42–52)
HCT VFR BLD CALC: 37.2 % (ref 42–52)
INR PPP: 1.09 (ref 0.93–1.08)
LYMPHOCYTES # BLD: 1.4 10*3/UL (ref 1.2–3.4)
LYMPHOCYTES # BLD: 1.6 10*3/UL (ref 1.2–3.4)
LYMPHOCYTES NFR BLD AUTO: 34.4 % (ref 22–35)
LYMPHOCYTES NFR BLD AUTO: 38.4 % (ref 22–35)
MAGNESIUM SERPL-MCNC: 1.8 MG/DL (ref 1.7–2.2)
MCH RBC QN AUTO: 31.8 PG (ref 25–35)
MCH RBC QN AUTO: 32.1 PG (ref 25–35)
MCHC RBC AUTO-ENTMCNC: 33.1 G/DL (ref 31–37)
MCHC RBC AUTO-ENTMCNC: 33.3 G/DL (ref 31–37)
MCV RBC AUTO: 96 FL (ref 80–105)
MCV RBC AUTO: 96.4 FL (ref 80–105)
MONOCYTES # BLD AUTO: 0.3 10*3/UL (ref 0.1–0.6)
MONOCYTES # BLD AUTO: 0.4 10*3/UL (ref 0.1–0.6)
MONOCYTES NFR BLD: 8.7 % (ref 1–6)
MONOCYTES NFR BLD: 9.5 % (ref 1–6)
PHOSPHATE SERPL-MCNC: 3 MG/DL (ref 2.5–4.5)
PLATELET # BLD: 198 10^3/UL (ref 120–450)
PLATELET # BLD: 216 10^3/UL (ref 120–450)
PMV BLD AUTO: 9.5 FL (ref 7–11)
PMV BLD AUTO: 9.8 FL (ref 7–11)
POTASSIUM SERPL-SCNC: 4 MMOL/L (ref 3.6–5)
POTASSIUM SERPL-SCNC: 4.2 MMOL/L (ref 3.6–5)
PROT SERPL-MCNC: 6.7 G/DL (ref 5.8–8.3)
PROT SERPL-MCNC: 7.2 G/DL (ref 5.8–8.3)
SODIUM SERPL-SCNC: 143 MMOL/L (ref 132–148)
SODIUM SERPL-SCNC: 143 MMOL/L (ref 132–148)
TROPONIN I SERPL-MCNC: < 0.01 NG/ML
WBC # BLD AUTO: 3.9 10^3/UL (ref 4.5–11)
WBC # BLD AUTO: 4.2 10^3/UL (ref 4.5–11)

## 2017-10-30 PROCEDURE — 3E0F7GC INTRODUCTION OF OTHER THERAPEUTIC SUBSTANCE INTO RESPIRATORY TRACT, VIA NATURAL OR ARTIFICIAL OPENING: ICD-10-PCS

## 2017-10-30 RX ADMIN — ENOXAPARIN SODIUM SCH MG: 30 INJECTION SUBCUTANEOUS at 10:52

## 2017-10-30 RX ADMIN — BUDESONIDE SCH MG: 0.5 SUSPENSION RESPIRATORY (INHALATION) at 07:54

## 2017-10-30 RX ADMIN — METHYLPREDNISOLONE SODIUM SUCCINATE SCH MG: 40 INJECTION, POWDER, FOR SOLUTION INTRAMUSCULAR; INTRAVENOUS at 10:52

## 2017-10-30 RX ADMIN — LEVALBUTEROL SCH MG: 0.63 SOLUTION RESPIRATORY (INHALATION) at 13:41

## 2017-10-30 RX ADMIN — METHYLPREDNISOLONE SODIUM SUCCINATE SCH MG: 40 INJECTION, POWDER, FOR SOLUTION INTRAMUSCULAR; INTRAVENOUS at 21:34

## 2017-10-30 RX ADMIN — BUDESONIDE SCH: 0.5 SUSPENSION RESPIRATORY (INHALATION) at 20:00

## 2017-10-30 RX ADMIN — LEVALBUTEROL SCH: 0.63 SOLUTION RESPIRATORY (INHALATION) at 20:00

## 2017-10-30 RX ADMIN — LEVALBUTEROL SCH MG: 0.63 SOLUTION RESPIRATORY (INHALATION) at 07:54

## 2017-10-30 NOTE — ED PDOC
Arrival/HPI





- General


Historian: Patient, Family (daughter at bedside)





- History of Present Illness


Time/Duration: 24 hours


Symptom Onset: Sudden


Symptom Course: Intermittent


Quality: Unable to Describe


Severity Level: Mild, Moderate


Activities at Onset: Rest


Context: Home





- General


Chief Complaint: Shortness Of Breath


Time Seen by Provider: 10/30/17 01:16





- History of Present Illness


Narrative History of Present Illness (Text): 





10/30/17 01:17





87M w/PMH sig for BPH, HTN, hx colon CA s/p resection, Hx L eye CA s/p excision

, asthma with inhaler evaluated for SOB x 1 day.  Daughter reports pt was 

complaining of SOB, working hard to breath starting at approximately 1pm on day 

prior to evaluation.  Pt also c/o light headedness, back pain, wheezing.  Has 

had recent URI-cough (non productive), congestion.  Pt felt intermittently 

better throughout the day prior to evaluation.  Denies N & V, F & C, chest pain

, ab pain, changes in bowel or bladder habits. 





PMH: BPH, HTN, hx colon CA s/p resection, Hx L eye CA s/p excision, asthma with 

inhaler


PSH: L eye excision, colon resection


All: NKDA


SH: Admits to occasional ETOH use, denies tobacco or illicit drug use; lives 

with daughter


PMD: Isaias


 (Jamaica Meyer)





Past Medical History





- Provider Review


Nursing Documentation Reviewed: Yes





- Infectious Disease


Hx of Infectious Diseases: None





- Tetanus Immunization


Tetanus Immunization: Unknown





- Cardiac


Hx Hypertension: Yes





- Pulmonary


Hx Respiratory Disorders: No





- Neurological


Hx Neurological Disorder: No





- HEENT


Hx HEENT Disorder: Yes


Hx Blind: Yes (left eye cancer)


Hx Deafness: Yes (Hard-of-hearing)


Hx Glaucoma: Yes (right eye)





- Renal


Hx Renal Disorder: No





- Endocrine/Metabolic


Hx Endocrine Disorders: No





- Hematological/Oncological


Hx Blood Disorders: No


Hx Cancer: Yes (Colon ca s/p resection)





- Integumentary


Hx Dermatological Disorder: No





- Musculoskeletal/Rheumatological


Hx Musculoskeletal Disorders: No


Hx Falls: Yes





- Gastrointestinal


Hx Gastrointestinal Disorders: No





- Genitourinary/Gynecological


Hx Genitourinary Disorders: Yes


Hx Prostate Problems: Yes (BPH)





- Psychiatric


Hx Psychophysiologic Disorder: No


Hx Depression: No


Hx Emotional Abuse: No


Hx Physical Abuse: No


Hx Substance Use: No





- Surgical History


Other/Comment: colon resection; left eye removal





- Anesthesia


Hx Anesthesia: Yes


Hx Anesthesia Reactions: No


Hx Malignant Hyperthermia: No





- Suicidal Assessment


Feels Threatened In Home Enviroment: No





Family/Social History





- Physician Review


Nursing Documentation Reviewed: Yes


Family/Social History: No Known Family HX


Smoking Status: Former Smoker


Hx Alcohol Use: No


Hx Substance Use: No


Hx Substance Use Treatment: No





Allergies/Home Meds


Allergies/Adverse Reactions: 


Allergies





No Known Allergies Allergy (Verified 10/30/17 01:17)


 








Home Medications: 


 Home Meds











 Medication  Instructions  Recorded  Confirmed


 


Fluticasone/Vilanterol [Breo 1 inhaler INH Q72 10/30/17 10/30/17





Ellipta 100-25 Mcg INH]   














Review of Systems





- Review of Systems


Constitutional: Normal.  absent: Fevers


Eyes: Normal.  absent: Vision Changes


ENT: Sinus Congestion.  absent: Normal


Respiratory: SOB, Cough, Wheezing.  absent: Normal


Cardiovascular: MACDONALD.  absent: Normal, Chest Pain


Gastrointestinal: Normal.  absent: Nausea, Vomiting


Genitourinary Male: Frequency (baseline).  absent: Normal


Musculoskeletal: Normal, Back Pain (chronic, low back)


Skin: Normal.  absent: Rash


Neurological: Normal.  absent: Headache





Physical Exam


Vital Signs Reviewed: Yes


Temperature: Afebrile


Blood Pressure: Hypertensive


Pulse: Regular


Respiratory Rate: Normal


Appearance: Positive for: Non-Toxic, Comfortable


Pain Distress: None


Mental Status: Positive for: Alert and Oriented X 3





- Systems Exam


Head: No: Atraumatic (Left eye missing)


Ears: Present: Normal


Mouth: Present: Moist Mucous Membranes


Nose (External): Present: Atraumatic


Neck: Present: Normal Range of Motion


Respiratory/Chest: Present: Clear to Auscultation, Good Air Exchange.  No: 

Respiratory Distress, Accessory Muscle Use, Wheezes, Retracting, Tachypneic


Cardiovascular: Present: Regular Rate and Rhythm, Normal S1, S2.  No: Murmurs


Abdomen: Present: Normal Bowel Sounds.  No: Tenderness, Distention, Peritoneal 

Signs


Back: Present: Normal Inspection, Paraspinal Tenderness (low back, mild).  No: 

CVA Tenderness, Midline Tenderness


Upper Extremity: Present: Normal Inspection.  No: Cyanosis, Edema


Lower Extremity: Present: Edema (slight bilat).  No: Normal Inspection, 

Tenderness, Swelling


Neurological: Present: GCS=15, CN II-XII Intact, Speech Normal


Skin: Present: Warm, Dry, Normal Color.  No: Rashes


Psychiatric: Present: Alert (hard of hearing), Oriented x 3, Normal Insight, 

Normal Concentration


Vital Signs











  Temp Pulse Resp BP Pulse Ox


 


 10/30/17 02:57   72  19  169/94 H  99


 


 10/30/17 01:37    19   99


 


 10/30/17 01:03  97.9 F  76  14  154/82 H  95














Medical Decision Making


ED Course and Treatment: 





In agreement with resident note, which includes further HPI details. Patient 

was seen and evaluated with resident, came up with plan and treatment together.


 (Lance Graf)





10/30/17 01:22


pt seen/evaluated, will order work up to r/o pna. 


10/30/17 03:03


[facility] ED attending, will put pt up for admission for dyspnea. 


10/30/17 03:04


Spoke with medical resident regarding admission, will contact Dr. Taylor for 

admission. 


10/30/17 03:08


Spoke with Dr. Taylor who accepts pt for tele admission for dyspnea.  (Jamaica Meyer)





- Lab Interpretations


Lab Results: 








 10/30/17 02:00 





 10/30/17 02:00 





 Lab Results





10/30/17 02:00: Sodium 143, Potassium 4.0, Chloride 105, Carbon Dioxide 32, 

Anion Gap 10, BUN 19, Creatinine 1.5, Est GFR (African Amer) 54, Est GFR (Non-

Af Amer) 44, Random Glucose 89, Calcium 10.1, Total Bilirubin 0.7, AST 36, ALT 

31, Alkaline Phosphatase 50, Lactate Dehydrogenase 373, Total Creatine Kinase 

101, Troponin I < 0.01, NT-Pro-B Natriuret Pep 99.0, Total Protein 7.2, Albumin 

4.0, Globulin 3.2, Albumin/Globulin Ratio 1.3


10/30/17 02:00: WBC 3.9 L, RBC 3.86, Hgb 12.4 L, Hct 37.2 L, MCV 96.4, MCH 32.1

, MCHC 33.3, RDW 12.7, Plt Count 216, MPV 9.8, Gran % 54.6, Lymph % (Auto) 34.4

, Mono % (Auto) 8.7 H, Eos % (Auto) 2.0, Baso % (Auto) 0.3, Gran # 2.14, Lymph 

# 1.4, Mono # 0.3, Eos # 0.1, Baso # 0.01











- RAD Interpretation


Radiology Orders: 








10/30/17 01:23


CHEST PORTABLE [RAD] Stat 














- Medication Orders


Current Medication Orders: 











Discontinued Medications





Albuterol/Ipratropium (Duoneb 3 Mg/0.5 Mg (3 Ml) Ud)  3 ml IH STAT STA


   Stop: 10/30/17 02:58











Disposition/Present on Arrival





- Present on Arrival


Any Indicators Present on Arrival: No


History of DVT/PE: No


History of Uncontrolled Diabetes: No


Urinary Catheter: No


History Surgical Site Infection Following: None





- Disposition


Have Diagnosis and Disposition been Completed?: Yes


Disposition Time: 03:06


Patient Plan: Observation





- Disposition


Diagnosis: 


 Dyspnea





Disposition: HOSPITALIZED


Condition: FAIR


Forms:  [facility] (English)

## 2017-10-30 NOTE — CP.PCM.HP
History of Present Illness





- History of Present Illness


History of Present Illness: 





This is a 87 year old man with a past medical history of colon cancer(s/p 

resection), left eye cancer (s/p excision), bph and hypertension who comes in 

complaining of shortness of breath for the past two months.  The patient 

reports coming in today because the shortness of breath became sever earlier 

this afternoon.  The patient reports taking his albuterol pump however denies 

any improvement in his symptoms.  The patient also reports a recent URI about 

one week ago. The patient also reports chest pain that is located bilaterally 

on his chest with no radiation that started today.  The patient rates the pain 

an 7/10 in severity and describes it as sharp in nature.  The patient denies 

any abdominal pain, constipation, diarrhea, sore throat, fevers, chills, 

changes in vision, syncopal episodes, or any other complaints.





PMD: Dr. Matos





Past medical history: See hpi


Medications: Memantine, seroquel, hctz, breo, and tamsulosin


Past surgical history: Resection of part of colon, left eye removal


Allergies: NKDA


Social history: reports being a social drinker. Former smoker. Quit the year 

2000. Denies illicit drug use. Lives with daughter.














Present on Admission





- Present on Admission


Any Indicators Present on Admission: No





Review of Systems





- Constitutional


Constitutional: absent: Anorexia, Chills, Frequent Falls, Night Sweats, Weakness





- EENT


Eyes: absent: Blind Spots, Blurred Vision, Discharge, Irritation, Loss of 

Peripheral Vision, Loss of Vision


Ears: absent: Decreased Hearing, Disequilibrium, Dizziness


Nose/Mouth/Throat: absent: Nasal Congestion, Nasal Trauma, Bleeding Gums, 

Dysphagia, Mouth Pain, Throat Swelling





- Cardiovascular


Cardiovascular: Chest Pain, Chest Pain with Activity, Dyspnea.  absent: Chest 

Pain at Rest, Claudication, Diaphoresis





- Respiratory


Respiratory: Dyspnea.  absent: Cough, Wheezing, Snoring, Stridor





- Gastrointestinal


Gastrointestinal: absent: Abdominal Pain, Belching, Change in Stool Character, 

Diarrhea, Excessive Flatus, Loose Stools





- Genitourinary


Genitourinary: absent: Change in Urinary Stream, Pyuria, Nocturia, Urinary 

Hesitance, Urinary Urgency





- Musculoskeletal


Musculoskeletal: absent: Abnormal Gait, Arthralgias, Joint Swelling, Limited 

Range of Motion, Muscle Weakness, Stiffness





- Integumentary


Integumentary: absent: Alopecia, Hirsutism, Lesions, Rash, Swelling





- Neurological


Neurological: absent: Disequilibrium, Dizziness, Numbness, Headaches, Lack of 

Coordination, Paresthesias





- Psychiatric


Psychiatric: absent: Anxiety, Change in Appetite, Depression, Panic Attacks





- Endocrine


Endocrine: absent: Change in Body Appearance, Deepening of Voice, Excessive 

Sweating





Past Patient History





- Infectious Disease


Hx of Infectious Diseases: None





- Tetanus Immunizations


Tetanus Immunization: Unknown





- Past Social History


Smoking Status: Former Smoker





- CARDIAC


Hx Hypertension: Yes





- PULMONARY


Hx Respiratory Disorders: No





- NEUROLOGICAL


Hx Neurological Disorder: No





- HEENT


Hx HEENT Problems: Yes


Hx Blind: Yes (left eye cancer)


Hx Deafness: Yes (Hard-of-hearing)


Hx Glaucoma: Yes (right eye)





- RENAL


Hx Chronic Kidney Disease: No





- ENDOCRINE/METABOLIC


Hx Endocrine Disorders: No





- HEMATOLOGICAL/ONCOLOGICAL


Hx Blood Disorders: No


Hx Cancer: Yes (Colon ca s/p resection)





- INTEGUMENTARY


Hx Dermatological Problems: No





- MUSCULOSKELETAL/RHEUMATOLOGICAL


Hx Musculoskeletal Disorders: No


Hx Falls: Yes





- GASTROINTESTINAL


Hx Gastrointestinal Disorders: No





- GENITOURINARY/GYNECOLOGICAL


Hx Genitourinary Disorders: Yes


Hx Prostate Problems: Yes (BPH)





- PSYCHIATRIC


Hx Psychophysiologic Disorder: No


Hx Depression: No


Hx Emotional Abuse: No


Hx Physical Abuse: No


Hx Substance Use: No





- SURGICAL HISTORY


Other/Comment: colon resection; left eye removal





- ANESTHESIA


Hx Anesthesia: Yes


Hx Anesthesia Reactions: No


Hx Malignant Hyperthermia: No





Meds


Allergies/Adverse Reactions: 


 Allergies











Allergy/AdvReac Type Severity Reaction Status Date / Time


 


No Known Allergies Allergy   Verified 10/30/17 01:17














Physical Exam





- Head Exam


Head Exam: ATRAUMATIC, NORMAL INSPECTION, NORMOCEPHALIC





- Eye Exam


Eye Exam: EOMI


Pupil Exam: NORMAL ACCOMODATION


Additional comments: 





Left eye s/p resection





- ENT Exam


ENT Exam: Mucous Membranes Moist, Normal Exam.  absent: Normal Oropharynx, TM's 

Normal Bilaterally





- Neck Exam


Neck exam: Positive for: Normal Inspection.  Negative for: Lymphadenopathy, 

Thyromegaly





- Respiratory Exam


Respiratory Exam: Decreased Breath Sounds.  absent: Prolonged Expiratory Phase, 

Respiratory Distress, Stridor





- Cardiovascular Exam


Cardiovascular Exam: REGULAR RHYTHM, +S1, +S2.  absent: Gallop, RRR, Rubs





- GI/Abdominal Exam


GI & Abdominal Exam: Normal Bowel Sounds, Soft.  absent: Distended, Organomegaly

, Tenderness





- Extremities Exam


Extremities exam: Positive for: normal inspection.  Negative for: full ROM, 

joint swelling, pedal edema, tenderness





- Back Exam


Back exam: NORMAL INSPECTION.  absent: CVA tenderness (L), CVA tenderness (R), 

paraspinal tenderness





- Neurological Exam


Neurological exam: Oriented x3





- Psychiatric Exam


Psychiatric exam: Normal Affect, Normal Mood





- Skin


Skin Exam: Dry, Intact





Results





- Vital Signs


Recent Vital Signs: 





 Last Vital Signs











Temp  97.9 F   10/30/17 01:03


 


Pulse  72   10/30/17 02:57


 


Resp  19   10/30/17 02:57


 


BP  169/94 H  10/30/17 02:57


 


Pulse Ox  99   10/30/17 02:57














- Labs


Result Diagrams: 


 10/30/17 05:45





 10/30/17 05:45





Assessment & Plan





- Assessment and Plan (Free Text)


Assessment: 





This is a 87 year old male with a past medical history of colon cancer(s/p 

resection), left eye cancer (s/p excision), bph and hypertension who is being 

admitted for dyspnea and chest pain.


Plan: 





1.Chest pain r/o acs


-Last echo test done 5/12/17 showed normal EF 60-65%, Trace A.R., Mild M.R., 

and Mild T.R.


-Last stress test done 5/12/17 showed mild, fixed anterior defect suggestive of 

MI, fixed inferior and infra-lateral defects d/t diaphragmatic, and normal 

gated wall motion


-EKG done today showed 1st degree AV block.


-Troponin (-)x1. Troponins trending. Will f/u with results


-Baby aspirin





2.Dyspnea


-Recent URI illness reported


-Influenza ordered. Will f/u with results


-Duonebs 


-Nasal cannula 2L. Maintian sp02 greater than 90





3.Hypertension


-restart home meds.





4.BPH


-restart home meds





5.h/o Colon cancer


-s/p resection


-no acute intervention indicated at this time.


-Will monitor closely





6.h/o Left eye cancer


-s/p excision


-No acute intervention indicated at this time.


-Will monitor closely.





GI ppx


-Protonix





DVT PPX


-SCD's

## 2017-10-30 NOTE — CON
CARDIOLOGY CONSULTATION



REASON FOR CONSULTATION:  Shortness of breath.



HISTORY OF PRESENT ILLNESS:  The patient is 87 years old 

male who has a history of colon CA, status post resection; history of left

eye cancer, status post excision, the patient is completely fine as he also

had excision of his left eye earlier.  The patient has a history of

bronchial asthma and was brought in because of shortness of breath.  The

patient did report to me chest discomfort that is nonradiating.



SOCIAL HISTORY:  The patient reported EtOH abuse.  He lives with his

daughter.



MEDICATIONS:  Albuterol inhaler q.4 hours p.r.n., aspirin 81 mg once a day,

Lovenox 30 mg once a day, Namenda 40 mg daily, hydrochlorothiazide 12.5 mg

once a day, Protonix 40 mg intravenously once a day, Seroquel 12.5 mg at

bedtime p.r.n. for agitation, Solu-Medrol 20 mg intravenously twice a day,

Xopenex inhaler q.2 hours p.r.n.



REVIEW OF SYSTEMS:  No fever or chills.  No vomiting or diarrhea.  No

dizziness or syncope.



PHYSICAL EXAMINATION:

GENERAL:  The patient is an elderly male, who does not appear to be in any

acute distress at this time.

VITAL SIGNS:  Blood pressure 140/83, heart rate 72, temperature 97.7,

respirations 20.

HEENT:  The patient is completely blind.

NECK:  No JVD.

CHEST:  Clear.  A Port-A-Cath is palpable in the right side at a much lower

level than expected.

HEART:  S1 and S2 regular.

ABDOMEN:  Soft.

EXTREMITIES:  No edema or calf tenderness.



LABORATORY DATA:  Hemoglobin and hematocrit 11.9 and 35.9, white count 4.2,

platelet count 198,000.  SMA-7:  Sodium 143, potassium 4.2, chloride 107,

CO2 of 29, glucose 92, BUN 19, creatinine 1.4.  Two sets of troponins are

negative.  Chest x-ray was unremarkable.  Exit for a Port-A-Cath looks to

have migrated much lower down in the chest wall and is not clear to me if

it has structural defects.  No lung infiltrate or pleural effusion.  EKG

revealed sinus rhythm with residual block, right bundle-branch block.  The

patient underwent SPECT scan in 05/2017, which was concluded as probably

abnormal SPECT myocardial perfusion study.  Mild fixed anterior defects

suggestive for myocardial injury.  Fixed inferior and inferolateral defects

are probably due to diaphragmatic attenuation, normal gated wall motion of

the left ventricle.  Echocardiography study performed in 05/2017 revealed

normal left ventricular size, mild concentric LVH, normal ejection

fraction.



ASSESSMENT:

1.  Chest pain.  Myocardial infarction ruled out.  The patient has probably

abnormal stress test in 05/2017.

2.  Abnormal echocardiogram with evidence of right bundle-branch block.

3.  History of bronchial asthma/chronic obstructive lung disease.

4.  Hypertension.



RECOMMENDATIONS:  Continue with aspirin 81 mg once a day, Lovenox 30 mg

once a day, hydrochlorothiazide 12.5 mg once a day, Protonix 40 mg

intravenously once a day, Solu-Medrol 20 mg intravenously twice a day.  I

will obtain PT, PTT and serum D-dimer.  I did review an EKG in 05/2017,

which revealed the same picture of right bundle-branch block.  No specific

workup is needed for that at this time.  Conservative medical approach is

recommended.  I will suggest that the Port-A-Cath which according to the

patient has been there since the 90s, which I cannot confirm for sure

defect, may have to be removed at this time.



__________________________________________

Nadir Francisco MD





DD:  10/30/2017 11:44:47

DT:  10/30/2017 13:28:33

Job # [id number]

## 2017-10-30 NOTE — CON
PULMONARY CONSULTATION



DATE:  10/30/2017



REFERRING PHYSICIAN:  Priscila Pedro MD



REASON FOR CONSULTATION:  Chronic obstructive pulmonary disease.



HISTORY OF PRESENT ILLNESS:  The patient is an 87-year-old male, with past

medical history significant for chronic obstructive pulmonary disease,

positive extensive smoking history, colon cancer, status post resection,

status post left eye surgery, legal blindness, who presents to [facility] with main

complaints of increasing shortness of breath at rest and dyspnea on

exertion for 1 day.  The patient/daughter also reports cough and congestion

for the past 3 days.  There is no history of significant sputum production.

The patient also complains of intermittent chest pain over the past few

days.  There is no history of coughing up of blood.  There is no history of

chest pain - made worse with deep respirations.  There is no history of

temperatures, chills or infectious exposure.  There is no history of night

sweats, weight loss or appetite change prior to the above events.  No

history of leg or calf pains.  No history of syncope or diaphoresis.  No

history of recent travel or trauma.



REVIEW OF SYSTEMS:  No history of nausea, vomiting or diarrhea.  No acute

urinary symptoms.  No new neurologic or musculoskeletal complaints.  Rest

of the review of systems negative.



ALLERGIES:  NO KNOWN ALLERGIES.



SOCIAL HISTORY:  Positive for extensive tobacco usage.  No alcohol.



FAMILY HISTORY:  No inheritable diseases.



HOME MEDICATIONS:  Include Breo Ellipta, Seroquel, Flomax, Namenda and

Microzide.



PHYSICAL EXAMINATION:

GENERAL:  The patient is not short of breath at rest.  He is not using

accessory muscles for breathing.

VITAL SIGNS:  Temperature is 97.7, pulse 72, respirations 18/20, blood

pressure 140/83.  Oxygen saturation on nasal cannula is 100%.

HEENT:  Normocephalic, atraumatic.  No JVD.

CARDIOVASCULAR:  Systolic ejection murmur at the lower left sternal border.

No S3 gallop.

LUNGS:  Decreased breath sounds at the bases.  Minimal rhonchi.  Minimal

wheezing.

EXTREMITIES:  No clubbing, cyanosis or edema.  Calves are nontender to

palpation.

GI:  Abdomen is soft, nontender and nondistended.  Bowel sounds are

positive.

SKIN:  No acute rash.

NEUROLOGIC:  Exam limited at the present time.



PERTINENT LABORATORY DATA:  Chest x-ray was done and reviewed.  I compared

the most recent film to the film done on 05/11/2017.  Compared to the film

done on 05/11/2017, there are no acute changes on the most recent film. 

Complete metabolic profile is completely within normal limits.  CBC:  White

count 4.2, hemoglobin 11.9, hematocrit 35.9, platelets of 198.



IMPRESSION:

1.  Acute bronchitis.

2.  Chronic obstructive pulmonary disease.

3.  Chest pain.

4.  Mild anemia.



PLAN:  The patient presents to [facility] with main complaints

of increasing shortness of breath at rest, and dyspnea on exertion for the past

day.  There is also a history of cough and congestion for the past 3 days. 

Lastly, the patient did present with intermittent chest pain.  He was thus

admitted for additional evaluation.  I did review the chest x-ray as above.

There is no acute change noted.  On physical exam, the patient is in

mild-to-moderate bronchospasm.  I will change the nebulizer treatments to

Xopenex and add low-dose intravenous steroids, as well as inhaled steroids.

The patient is on Breo-elipta at home.  There is no significant alveolar-
arterial

gradient.  Oxygen saturation on nasal cannula is 100%.  Repeat a.m. labs

are pending.  There are no temperatures noted.  There is no leukocytosis. 

Cardiology evaluation with Dr. Francisco has also been ordered.  The

patient does feel little better, and is clinically improved this morning. 

Additional pulmonary intervention will be based on the clinical status of

the patient.  I did discuss the above with Dr. Pedro, at length.



Thank you very much for this pulmonary consultation.





__________________________________________

Declan Summers MD



DD:  10/30/2017 7:59:55

DT:  10/30/2017 8:05:15

Job # [id number]

[id number]

## 2017-10-30 NOTE — RAD
HISTORY:

cough  



COMPARISON:

05/11/2017 



FINDINGS:



LUNGS:

No active pulmonary disease.



PLEURA:

No significant pleural effusion identified, no pneumothorax apparent.



CARDIOVASCULAR:

Normal.



OSSEOUS STRUCTURES:

No significant abnormalities.



VISUALIZED UPPER ABDOMEN:

Normal.



OTHER FINDINGS:

Right-sided Port-A-Cath.  The catheter terminates in the upper SVC



IMPRESSION:

No active disease.

## 2017-10-30 NOTE — CARD
--------------- APPROVED REPORT --------------





EKG Measurement

Heart [id number]

OR [id number]

[id number]

[id number]

[id number]



<Conclusion>

Sinus rhythm with 1st degree AV block with premature atrial complexes

Right bundle branch block

Abnormal ECG

## 2017-10-31 VITALS — RESPIRATION RATE: 20 BRPM

## 2017-10-31 LAB
ALBUMIN/GLOB SERPL: 1.2 {RATIO} (ref 1.1–1.8)
ALP SERPL-CCNC: 44 U/L (ref 38–126)
ALT SERPL-CCNC: 32 U/L (ref 7–56)
AST SERPL-CCNC: 29 U/L (ref 17–59)
BASOPHILS # BLD AUTO: 0 K/MM3 (ref 0–2)
BASOPHILS NFR BLD: 0 % (ref 0–3)
BILIRUB SERPL-MCNC: 0.9 MG/DL (ref 0.2–1.3)
BUN SERPL-MCNC: 19 MG/DL (ref 7–21)
CALCIUM SERPL-MCNC: 10.6 MG/DL (ref 8.4–10.5)
CHLORIDE SERPL-SCNC: 106 MMOL/L (ref 98–107)
CHOLEST SERPL-MCNC: 209 MG/DL (ref 130–200)
CO2 SERPL-SCNC: 27 MMOL/L (ref 21–33)
EOSINOPHIL # BLD: 0 10*3/UL (ref 0–0.7)
EOSINOPHIL NFR BLD: 0 % (ref 1.5–5)
ERYTHROCYTE [DISTWIDTH] IN BLOOD BY AUTOMATED COUNT: 12.6 % (ref 11.5–14.5)
GLOBULIN SER-MCNC: 3.4 GM/DL
GLUCOSE SERPL-MCNC: 124 MG/DL (ref 70–110)
GRANULOCYTES # BLD: 3.97 10*3/UL (ref 1.4–6.5)
GRANULOCYTES NFR BLD: 85.7 % (ref 50–68)
HCT VFR BLD CALC: 39.2 % (ref 42–52)
LYMPHOCYTES # BLD: 0.6 10*3/UL (ref 1.2–3.4)
LYMPHOCYTES NFR BLD AUTO: 13.4 % (ref 22–35)
MCH RBC QN AUTO: 32.4 PG (ref 25–35)
MCHC RBC AUTO-ENTMCNC: 33.7 G/DL (ref 31–37)
MCV RBC AUTO: 96.3 FL (ref 80–105)
MONOCYTES # BLD AUTO: 0 10*3/UL (ref 0.1–0.6)
MONOCYTES NFR BLD: 0.9 % (ref 1–6)
PLATELET # BLD: 222 10^3/UL (ref 120–450)
PMV BLD AUTO: 9.9 FL (ref 7–11)
POTASSIUM SERPL-SCNC: 4.7 MMOL/L (ref 3.6–5)
PROT SERPL-MCNC: 7.6 G/DL (ref 5.8–8.3)
SODIUM SERPL-SCNC: 145 MMOL/L (ref 132–148)
WBC # BLD AUTO: 4.6 10^3/UL (ref 4.5–11)

## 2017-10-31 RX ADMIN — LEVALBUTEROL SCH MG: 0.63 SOLUTION RESPIRATORY (INHALATION) at 08:02

## 2017-10-31 RX ADMIN — LEVALBUTEROL SCH MG: 0.63 SOLUTION RESPIRATORY (INHALATION) at 19:47

## 2017-10-31 RX ADMIN — POLYETHYLENE GLYCOL 3350 SCH GM: 17 POWDER, FOR SOLUTION ORAL at 09:24

## 2017-10-31 RX ADMIN — LEVALBUTEROL SCH MG: 0.63 SOLUTION RESPIRATORY (INHALATION) at 01:20

## 2017-10-31 RX ADMIN — POLYETHYLENE GLYCOL 3350 SCH GM: 17 POWDER, FOR SOLUTION ORAL at 17:28

## 2017-10-31 RX ADMIN — ENOXAPARIN SODIUM SCH MG: 30 INJECTION SUBCUTANEOUS at 09:23

## 2017-10-31 RX ADMIN — BUDESONIDE SCH MG: 0.5 SUSPENSION RESPIRATORY (INHALATION) at 19:47

## 2017-10-31 RX ADMIN — BUDESONIDE SCH MG: 0.5 SUSPENSION RESPIRATORY (INHALATION) at 08:03

## 2017-10-31 RX ADMIN — LEVALBUTEROL SCH MG: 0.63 SOLUTION RESPIRATORY (INHALATION) at 13:25

## 2017-10-31 NOTE — CP.PCM.PN
<Claus Whittaker - Last Filed: 10/31/17 16:46>





Subjective





- Date & Time of Evaluation


Date of Evaluation: 10/31/17


Time of Evaluation: 07:40





- Subjective


Subjective: 





patient was seen and examined at bedside. he denies any complaints of sob or cp 

but is complaining of some constipation. pt is told that physical therapy will 

see him today to determine his disposition. pt denies n/v/d/, f/c, cp, sob, 

abdominal pain, headache, back/neck pain or any weakness.





Objective





- Vital Signs/Intake and Output


Vital Signs (last 24 hours): 


 











Temp Pulse Resp BP Pulse Ox


 


 98.4 F   92 H  16   131/69   100 


 


 10/31/17 12:00  10/31/17 14:00  10/31/17 12:00  10/31/17 12:00  10/31/17 06:00








Intake and Output: 


 











 10/31/17 10/31/17





 06:59 18:59


 


Intake Total 240 


 


Output Total 1200 


 


Balance -960 














- Medications


Medications: 


 Current Medications





Albuterol/Ipratropium (Duoneb 3 Mg/0.5 Mg (3 Ml) Ud)  3 ml IH Q4 PRN


   PRN Reason: Shortness of Breath


Aspirin (Ecotrin)  81 mg PO 0800 [facility]


   Last Admin: 10/31/17 09:24 Dose:  81 mg


Budesonide (Pulmicort Respules)  0.5 mg IH [id number] [facility]


   Last Admin: 10/31/17 08:03 Dose:  0.5 mg


Enoxaparin Sodium (Lovenox)  30 mg SC DAILY [facility]


   PRN Reason: Protocol


   Last Admin: 10/31/17 09:23 Dose:  30 mg


Hydrochlorothiazide (Microzide)  12.5 mg PO DAILY [facility]


   Last Admin: 10/31/17 09:24 Dose:  12.5 mg


Levalbuterol HCl (Xopenex)  0.63 mg IH [id number] [facility]


   Last Admin: 10/31/17 13:25 Dose:  0.63 mg


Levalbuterol HCl (Xopenex)  0.63 mg IH Q2 PRN


   PRN Reason: Shortness of Breath


Memantine Hcl [ Namenda Xr] 14 Mg ( Home Med)  14 mg PO DAILY [facility]


   Last Admin: 10/31/17 11:35 Dose:  Not Given


Pantoprazole Sodium (Protonix Inj)  40 mg IVP DAILY [facility]


   Last Admin: 10/31/17 09:24 Dose:  40 mg


Polyethylene Glycol (Miralax)  17 gm PO BID [facility]


   Last Admin: 10/31/17 09:24 Dose:  17 gm


Prednisone (Prednisone Tab)  30 mg PO DAILY [facility]


   Last Admin: 10/31/17 09:24 Dose:  30 mg


Quetiapine Fumarate (Seroquel)  12.5 mg PO HS PRN; Protocol


   PRN Reason: Agitation


   Last Admin: 10/30/17 21:35 Dose:  12.5 mg


Tamsulosin HCl (Flomax)  0.4 mg PO DAILY [facility]


   Last Admin: 10/31/17 09:24 Dose:  0.4 mg











- Labs


Labs: 


 





 10/31/17 06:15 





 10/31/17 06:15 





 











PT  12.0 SECONDS (9.4-12.5)   10/30/17  12:00    


 


INR  1.09  (0.93-1.08)  H  10/30/17  12:00    


 


APTT  32.9 Seconds (25.1-36.5)   10/30/17  12:00    














- Additional Findings


Additional findings: 





- Head Exam


Head Exam: ATRAUMATIC, NORMAL INSPECTION, NORMOCEPHALIC





- Eye Exam


Eye Exam: EOMI


Pupil Exam: NORMAL ACCOMODATION


Additional comments: 





Left eye s/p resection





- ENT Exam


ENT Exam: Mucous Membranes Moist, Normal Exam.  absent: Normal Oropharynx, TM's 

Normal Bilaterally





- Neck Exam


Neck exam: Positive for: Normal Inspection.  Negative for: Lymphadenopathy, 

Thyromegaly





- Respiratory Exam


Respiratory Exam: Normal Breath Sounds.  absent: Prolonged Expiratory Phase, 

Respiratory Distress, Stridor





- Cardiovascular Exam


Cardiovascular Exam: REGULAR RHYTHM, +S1, +S2.  absent: Gallop, RRR, Rubs





- GI/Abdominal Exam


GI & Abdominal Exam: Normal Bowel Sounds, Soft.  absent: Distended, Organomegaly

, Tenderness





- Extremities Exam


Extremities exam: Positive for: normal inspection.  Negative for: full ROM, 

joint swelling, pedal edema, tenderness





- Back Exam


Back exam: NORMAL INSPECTION.  absent: CVA tenderness (L), CVA tenderness (R), 

paraspinal tenderness





- Neurological Exam


Neurological exam: Oriented x3





- Psychiatric Exam


Psychiatric exam: Normal Affect, Normal Mood





- Skin


Skin Exam: Dry, Intact





Assessment and Plan





- Assessment and Plan (Free Text)


Assessment: 





87 year old male with a past medical history of colon cancer(s/p resection), 

left eye cancer (s/p excision), bph and hypertension who is being treated for 

acute asthma exacerbation.





Plan: 





1. Asthma exacerbation


- f/u influenza 


- Xopenex BRIANA and PRN


- Budesonide


- Duonebs PRN


- O2Sat good off O2 


- cont prednisone, titrate down


- Pulm consulted, recs appreciated





2.Chest pain likely 2/2 asthma exacerbation and cough,


- ACS ruled out


- Last echo test done 5/12/17 showed normal EF 60-65%, Trace A.R., Mild M.R., 

and Mild T.R.


- Last stress test done 5/12/17 showed mild, fixed anterior defect suggestive 

of MI, fixed inferior and infra-lateral defects d/t diaphragmatic, and normal 

gated wall motion


- EKG showed 1st degree AV block.


- Troponin (-)x3


- ASA 81


- LDL panel shows elevated chol and , HDL 53


- TSH 0.34


- Cardio recommends to avoid BB for CAD





3. constipation


- start Miralax





4.Hypertension


- cont HCTZ 





5.BPH


- cont Flomax 





6. h/o Colon cancer


- no acute intervention indicated at this time.


- Will monitor closely for any changes in H/H





7. h/o Left eye cancer


- No acute intervention indicated at this time.


- staff is informed that pt is visually impaired 





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


- cont seroquel and memantine





PTX/Lovenox 


HHD





Patient was seen, examined and discussed with attending, Dr. Willis Whittaker PGY1








<Priscila Pedro - Last Filed: 10/31/17 18:02>





Objective





- Vital Signs/Intake and Output


Vital Signs (last 24 hours): 


 











Temp Pulse Resp BP Pulse Ox


 


 98.4 F   82   20   150/72   100 


 


 10/31/17 17:54  10/31/17 17:59  10/31/17 17:54  10/31/17 17:54  10/31/17 06:00








Intake and Output: 


 











 10/31/17 10/31/17





 06:59 18:59


 


Intake Total 240 


 


Output Total 1200 


 


Balance -960 














- Medications


Medications: 


 Current Medications





Albuterol/Ipratropium (Duoneb 3 Mg/0.5 Mg (3 Ml) Ud)  3 ml IH Q4 PRN


   PRN Reason: Shortness of Breath


Aspirin (Ecotrin)  81 mg PO 0800 [facility]


   Last Admin: 10/31/17 09:24 Dose:  81 mg


Budesonide (Pulmicort Respules)  0.5 mg IH [id number] [facility]


   Last Admin: 10/31/17 08:03 Dose:  0.5 mg


Enoxaparin Sodium (Lovenox)  30 mg SC DAILY [facility]


   PRN Reason: Protocol


   Last Admin: 10/31/17 09:23 Dose:  30 mg


Hydrochlorothiazide (Microzide)  12.5 mg PO DAILY [facility]


   Last Admin: 10/31/17 09:24 Dose:  12.5 mg


Levalbuterol HCl (Xopenex)  0.63 mg IH [id number] [facility]


   Last Admin: 10/31/17 13:25 Dose:  0.63 mg


Levalbuterol HCl (Xopenex)  0.63 mg IH Q2 PRN


   PRN Reason: Shortness of Breath


Memantine Hcl [ Namenda Xr] 14 Mg ( Home Med)  14 mg PO DAILY [facility]


   Last Admin: 10/31/17 11:35 Dose:  Not Given


Pantoprazole Sodium (Protonix Inj)  40 mg IVP DAILY [facility]


   Last Admin: 10/31/17 09:24 Dose:  40 mg


Polyethylene Glycol (Miralax)  17 gm PO BID [facility]


   Last Admin: 10/31/17 17:28 Dose:  17 gm


Prednisone (Prednisone Tab)  30 mg PO DAILY [facility]


   Last Admin: 10/31/17 09:24 Dose:  30 mg


Quetiapine Fumarate (Seroquel)  12.5 mg PO HS PRN; Protocol


   PRN Reason: Agitation


   Last Admin: 10/30/17 21:35 Dose:  12.5 mg


Tamsulosin HCl (Flomax)  0.4 mg PO DAILY [facility]


   Last Admin: 10/31/17 09:24 Dose:  0.4 mg











- Labs


Labs: 


 





 10/31/17 06:15 





 10/31/17 06:15 





 











PT  12.0 SECONDS (9.4-12.5)   10/30/17  12:00    


 


INR  1.09  (0.93-1.08)  H  10/30/17  12:00    


 


APTT  32.9 Seconds (25.1-36.5)   10/30/17  12:00    














Attending/Attestation





- Attestation


I have personally seen and examined this patient.: Yes


I have fully participated in the care of the patient.: Yes


I have reviewed all pertinent clinical information, including history, physical 

exam and plan: Yes


Notes (Text): 





10/31/17 18:01





Attending note;


Patient seen and examined with resident.





Patient is complaining of cough and shortness of breath on exertion.


Improved since admission.


Continue oxygen when necessary, IV solumedrol.


We will get physical therapy and pulse ox on ambulation.





Pulmonary and cardiology evaluation appreciated.





Upon discharge the patient will follow-up with PMD Dr. Matos.

## 2017-10-31 NOTE — PN
DATE:



SUBJECTIVE:  The patient denies any chest pain.  Shortness of breath has

improved.



PHYSICAL EXAMINATION:

VITAL SIGNS:  Blood pressure 131/69, heart rate 79, temperature 98.4,

respirations 16.

HEENT:  The patient is legally blind.

NECK:  No JVD.

CHEST:  Minimal rhonchi.  No wheezing.

HEART:  S1 and S2, regular.

EXTREMITIES:  No edema.



LABORATORY DATA:  Hemoglobin and hematocrit _____.  White count and

platelet count are within normal limit.  Today's SMA-7 is within normal

limits except for glucose of 124.  Calcium is elevated at 10.6.  A total of

three troponins are negative.  Venous Doppler of the lower extremity, no

DVT in the visualized segments.



ASSESSMENT:

1.  Chest pain, myocardial infarction is ruled out.

2.  Chronic obstructive lung disease.

3.  Abnormal EKG with evidence of right bundle-branch block.

4.  Hypertension.



RECOMMENDATIONS:  Continue aspirin 81 mg once a day, Lovenox 30 mg once a

day, hydrochlorothiazide 12.5 mg once a day, prednisone 30 mg once a day,

Protonix 20 mg intravenously once a day, Xopenex inhaler q. 6 hours p.r.n.,

Seroquel 12.5 mg at bedtime.  The patient is not a suitable candidate for

beta blockers, at least at this time.  Continued medical therapy for

underlying coronary artery disease is justified.







__________________________________________

Nadir Francisco MD





DD:  10/31/2017 13:20:46

DT:  10/31/2017 13:33:10

Job # [id number]

## 2017-10-31 NOTE — PN
SUBJECTIVE:  The patient appears comfortable this morning.  He is not short

of breath at rest.  He denies chest pain.



PHYSICAL EXAMINATION:

VITAL SIGNS:  Temperature 98.3, pulse 80, respirations 18, blood pressure

164/86.  Oxygen saturation on room air is 100%.

HEENT:  Normocephalic, atraumatic.

NECK:  No JVD.

CARDIOVASCULAR:  Systolic ejection murmur at the lower left sternal border.

No S3 gallop.

LUNGS:  Improved breath sounds at the bases.  Very minimal/less rhonchi. 

No wheezing.

EXTREMITIES:  No clubbing, cyanosis or edema.  Calves are nontender to

palpation.

GASTROINTESTINAL:  Abdomen is soft, nontender and nondistended.  Bowel

sounds are positive.

SKIN:  No acute rash.

NEUROLOGIC:  Limited at the present time.



IMPRESSION:

1.  Acute bronchitis.

2.  Chronic obstructive pulmonary disease.

3.  Chest pain-resolved.

4.  Mild anemia.



PLAN:  The patient appears very comfortable this morning.  He is not short of

breath at rest.  His chest pain has resolved.  He does state to feeling

much better overall.  On physical exam, his bronchospasm also continues to

resolve.  In addition, the oxygen saturation on room air is now 100%.  I

will continue with the current nebulizer treatments, and change to oral

steroids this morning.  Cardiology evaluation is noted.  Repeat a.m. labs

are pending.  Clinical status of the patient is significantly

improved-compared to the initial presentation.  I will discuss the above

with the attending physician.







__________________________________________

Declan Summers MD





DD:  10/31/2017 8:00:51

DT:  10/31/2017 8:03:34

Job # [id number]



[id number]

## 2017-11-01 RX ADMIN — LEVALBUTEROL SCH MG: 0.63 SOLUTION RESPIRATORY (INHALATION) at 01:17

## 2017-11-01 RX ADMIN — LEVALBUTEROL SCH MG: 0.63 SOLUTION RESPIRATORY (INHALATION) at 07:36

## 2017-11-01 RX ADMIN — LEVALBUTEROL SCH MG: 0.63 SOLUTION RESPIRATORY (INHALATION) at 13:00

## 2017-11-01 RX ADMIN — LEVALBUTEROL SCH MG: 0.63 SOLUTION RESPIRATORY (INHALATION) at 19:40

## 2017-11-01 RX ADMIN — BUDESONIDE SCH MG: 0.5 SUSPENSION RESPIRATORY (INHALATION) at 19:41

## 2017-11-01 RX ADMIN — BUDESONIDE SCH MG: 0.5 SUSPENSION RESPIRATORY (INHALATION) at 07:36

## 2017-11-01 RX ADMIN — ENOXAPARIN SODIUM SCH MG: 30 INJECTION SUBCUTANEOUS at 09:13

## 2017-11-01 RX ADMIN — POLYETHYLENE GLYCOL 3350 SCH: 17 POWDER, FOR SOLUTION ORAL at 18:40

## 2017-11-01 RX ADMIN — POLYETHYLENE GLYCOL 3350 SCH GM: 17 POWDER, FOR SOLUTION ORAL at 09:13

## 2017-11-01 NOTE — CP.PCM.PN
<Claus Whittaker - Last Filed: 11/01/17 23:35>





Subjective





- Date & Time of Evaluation


Date of Evaluation: 11/01/17


Time of Evaluation: 09:45





- Subjective


Subjective: 





patient was seen and examined at bedside. states his breathing has improved and 

that he tolerated PT yesterday well. denies cp, sob, wheezing, cough, weakness, 

headache. complains of constipation, last BM being Sunday but is having +gas 

and an appetite. 





Objective





- Vital Signs/Intake and Output


Vital Signs (last 24 hours): 


 











Temp Pulse Resp BP Pulse Ox


 


 97.7 F   82   20   160/80 H  99 


 


 11/01/17 17:53  11/01/17 17:53  11/01/17 17:53  11/01/17 17:53  11/01/17 05:53








Intake and Output: 


 











 11/01/17 11/02/17





 18:59 06:59


 


Intake Total 900 


 


Output Total 525 


 


Balance 375 














- Medications


Medications: 


 Current Medications





Albuterol/Ipratropium (Duoneb 3 Mg/0.5 Mg (3 Ml) Ud)  3 ml IH Q4 PRN


   PRN Reason: Shortness of Breath


Aspirin (Ecotrin)  81 mg PO 0800 [facility]


   Last Admin: 11/01/17 09:13 Dose:  81 mg


Budesonide (Pulmicort Respules)  0.5 mg IH [id number] [facility]


   Last Admin: 11/01/17 19:41 Dose:  0.5 mg


Enoxaparin Sodium (Lovenox)  30 mg SC DAILY [facility]


   PRN Reason: Protocol


   Last Admin: 11/01/17 09:13 Dose:  30 mg


Hydrochlorothiazide (Microzide)  12.5 mg PO DAILY [facility]


   Last Admin: 11/01/17 09:13 Dose:  12.5 mg


Levalbuterol HCl (Xopenex)  0.63 mg IH [id number] [facility]


   Last Admin: 11/01/17 19:40 Dose:  0.63 mg


Levalbuterol HCl (Xopenex)  0.63 mg IH Q2 PRN


   PRN Reason: Shortness of Breath


Memantine Hcl [ Namenda Xr] 14 Mg ( Home Med)  14 mg PO DAILY [facility]


   Last Admin: 11/01/17 09:23 Dose:  Not Given


Pantoprazole Sodium (Protonix Inj)  40 mg IVP DAILY [facility]


   Last Admin: 11/01/17 09:12 Dose:  40 mg


Polyethylene Glycol (Miralax)  17 gm PO BID [facility]


   Last Admin: 11/01/17 18:40 Dose:  Not Given


Prednisone (Prednisone Tab)  30 mg PO DAILY [facility]


   Last Admin: 11/01/17 09:13 Dose:  30 mg


Quetiapine Fumarate (Seroquel)  12.5 mg PO HS PRN; Protocol


   PRN Reason: Agitation


   Last Admin: 11/01/17 22:23 Dose:  12.5 mg


Tamsulosin HCl (Flomax)  0.4 mg PO DAILY [facility]


   Last Admin: 11/01/17 09:13 Dose:  0.4 mg











- Labs


Labs: 


 











PT  12.0 SECONDS (9.4-12.5)   10/30/17  12:00    


 


INR  1.09  (0.93-1.08)  H  10/30/17  12:00    


 


APTT  32.9 Seconds (25.1-36.5)   10/30/17  12:00    














- Additional Findings


Additional findings: 





- Head Exam


Head Exam: ATRAUMATIC, NORMAL INSPECTION, NORMOCEPHALIC





- Eye Exam


Eye Exam: EOMI


Pupil Exam: NORMAL ACCOMODATION


Additional comments: 





Left eye s/p resection, visually impaired b/l





- ENT Exam


ENT Exam: Mucous Membranes Moist, Normal Exam.  absent: Normal Oropharynx, TM's 

Normal Bilaterally


Additional comments: 





Difficulty hearing





- Neck Exam


Neck exam: Positive for: Normal Inspection.  Negative for: Lymphadenopathy, 

Thyromegaly





- Respiratory Exam


Respiratory Exam: Normal Breath Sounds.  absent: Prolonged Expiratory Phase, 

Respiratory Distress, Stridor





- Cardiovascular Exam


Cardiovascular Exam: REGULAR RHYTHM, +S1, +S2.  absent: Gallop, RRR, Rubs





- GI/Abdominal Exam


GI & Abdominal Exam: Normal Bowel Sounds, Soft.  absent: Distended, Organomegaly

, Tenderness





- Extremities Exam


Extremities exam: Positive for: normal inspection.  Negative for: full ROM, 

joint swelling, pedal edema, tenderness





- Back Exam


Back exam: NORMAL INSPECTION.  absent: CVA tenderness (L), CVA tenderness (R), 

paraspinal tenderness





- Neurological Exam


Neurological exam: Oriented x3





- Psychiatric Exam


Psychiatric exam: Normal Affect, Normal Mood





- Skin


Skin Exam: Dry, Intact





Assessment and Plan





- Assessment and Plan (Free Text)


Assessment: 





87 year old male with a past medical history of colon cancer(s/p resection), 

left eye cancer (s/p excision), bph and hypertension who is being treated for 

acute asthma exacerbation.





Plan: 





1. Asthma exacerbation, resolved 


- f/u influenza 


- Xopenex BRIANA and PRN


- Budesonide


- Duonebs PRN


- O2Sat good off O2 


- cont prednisone 30mg PO Daily


- Pulm consulted, recs appreciated





2.Chest pain likely 2/2 asthma exacerbation and cough,


- ACS ruled out


- Troponin I negx3


- ASA 81


- LDL panel shows elevated chol and , HDL 53


- TSH 0.34


- Cardio recommends to avoid BB for CAD





3. constipation


- start Miralax


- mag cit also given





4.Hypertension


- cont HCTZ 





5.BPH


- cont Flomax 





6. h/o Colon cancer


- no acute intervention indicated at this time.


- Will monitor closely for any changes in H/H





7. h/o Left eye cancer


- No acute intervention indicated at this time.


- staff is informed that pt is visually impaired 





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


- cont seroquel and memantine





PTX/Lovenox 


HHD





Dispo: Patient was supposed to be discharged today but due to issues with his 

daughter not being home, he will spend the night at the hospital until the 

daughter can be reached.





Patient was seen, examined and discussed with attending, Dr. Willis Whittaker PGY1








<Priscila Pedro - Last Filed: 11/02/17 17:15>





Objective





- Vital Signs/Intake and Output


Vital Signs (last 24 hours): 


 











Temp Pulse Resp BP Pulse Ox


 


 98.4 F   74   20   153/89 H  95 


 


 11/02/17 06:00  11/02/17 06:00  11/02/17 06:00  11/02/17 06:00  11/02/17 06:00








Intake and Output: 


 











 11/02/17 11/02/17





 06:59 18:59


 


Intake Total 600 


 


Output Total 400 


 


Balance 200 














- Labs


Labs: 


 











PT  12.0 SECONDS (9.4-12.5)   10/30/17  12:00    


 


INR  1.09  (0.93-1.08)  H  10/30/17  12:00    


 


APTT  32.9 Seconds (25.1-36.5)   10/30/17  12:00    














Attending/Attestation





- Attestation


I have personally seen and examined this patient.: Yes


I have fully participated in the care of the patient.: Yes


I have reviewed all pertinent clinical information, including history, physical 

exam and plan: Yes


Notes (Text): 





11/02/17 16:02





Attending note;





Patient seen and examined with resident.





Patient is complaining of cough and shortness of breath on exertion/COPD 

exacerbation.


Chest pain resolved. cardiac enzyme negative.





Treated  oxygen when necessary, IV solumedrol.


 physical therapy evaluation appreciated.





Pulmonary and cardiology evaluation appreciated.





we will discharge the patient home with Medrol Dosepak, albuterol inhaler.





Upon discharge the patient will follow-up with PMD Dr. Matos.





diagnosis;


COPD exacerbation


Chest pain


Left eye blindness


History of colon cancer

## 2017-11-01 NOTE — PN
SUBJECTIVE:  The patient appears comfortable this morning.  He is not short

of breath at rest.



PHYSICAL EXAMINATION:

VITAL SIGNS:  Temperature is 98.6, pulse 70, respirations 18/20, blood

pressure 136/78.  Oxygen saturation on room air is 99%.

HEENT:  Normocephalic, atraumatic.

NECK:  No JVD.

CARDIOVASCULAR:  Systolic ejection murmur at the lower left sternal border.

No S3 gallop.

LUNGS:  Better breath sounds at the bases.  Minimal/less rhonchi.  No

wheezing.

EXTREMITIES:  No clubbing, cyanosis or edema.  Calves are nontender to

palpation.

GASTROINTESTINAL:  Abdomen is soft, nontender and nondistended.  Bowel

sounds are positive.

SKIN:  No acute rash.

NEUROLOGIC:  Limited at the present time.



IMPRESSION:

1.  Acute bronchitis.

2.  Chronic obstructive pulmonary disease.

3.  Chest pain-resolved.

4.  Mild anemia.



PLAN:  The patient appears comfortable this morning.  He is not short of

breath at rest.  His chest pain has resolved.  He does state to feeling

much better overall.  On physical exam, his bronchospasm continues to

slowly resolve.  In addition, the alveolar-arterial gradient also continues

to resolve.  Oxygen saturation on room air is now 99%.  I will continue

with the current nebulizer treatments and oral steroids(changed yesterday) for 
now. 

Cardiology evaluation is ongoing.  Input by Dr. Francisco is noted. 

Clinical status of the patient is significantly improved-compared to the

initial presentation.  I will discuss the above with the attending

physician.







__________________________________________

Declan Summers MD





DD:  11/01/2017 6:56:46

DT:  11/01/2017 6:57:40

Job # [id number]



MTDD

## 2017-11-01 NOTE — CP.PCM.PCO
Addendum


Addendum: 





11/01/17 13:13


I have tried to contact Kandi, the patient's daughter, once yesterday, this 

morning and twice in the past hour at [phone] (which I used to speak to 

her when pt was originally admitted) to explain to her that his lab results are 

normal and that patient is discharged, however I get no response or answering 

machine. will continue to attempt, and speak to pt about having him call the 

daughter himself.


11/01/17 13:15

## 2017-11-02 VITALS
HEART RATE: 74 BPM | OXYGEN SATURATION: 95 % | TEMPERATURE: 98.4 F | DIASTOLIC BLOOD PRESSURE: 89 MMHG | SYSTOLIC BLOOD PRESSURE: 153 MMHG

## 2017-11-02 RX ADMIN — LEVALBUTEROL SCH MG: 0.63 SOLUTION RESPIRATORY (INHALATION) at 13:32

## 2017-11-02 RX ADMIN — ENOXAPARIN SODIUM SCH MG: 30 INJECTION SUBCUTANEOUS at 09:24

## 2017-11-02 RX ADMIN — POLYETHYLENE GLYCOL 3350 SCH GM: 17 POWDER, FOR SOLUTION ORAL at 09:25

## 2017-11-02 RX ADMIN — BUDESONIDE SCH MG: 0.5 SUSPENSION RESPIRATORY (INHALATION) at 07:26

## 2017-11-02 RX ADMIN — LEVALBUTEROL SCH MG: 0.63 SOLUTION RESPIRATORY (INHALATION) at 07:26

## 2017-11-02 RX ADMIN — LEVALBUTEROL SCH MG: 0.63 SOLUTION RESPIRATORY (INHALATION) at 01:29

## 2017-11-02 NOTE — CP.PCM.PCO
Addendum


Addendum: 





11/02/17 07:54


Resident discussed with Kandi (daughter) that pt was medically stable and 

discharged to go home. She stated that she will be able to come pick him today 

in the afternoon.

## 2017-11-02 NOTE — CP.PCM.DIS
<Claus Whittaker - Last Filed: 11/02/17 18:32>





Provider





- Provider


Date of Admission: 


10/31/17 16:46





Attending physician: 


Priscila Pedro MD





Primary care physician: 


Isai Esparza MD





Time Spent in preparation of Discharge (in minutes): 45





Hospital Course





- Lab Results


Lab Results: 


 Most Recent Lab Values











WBC  4.6 10^3/ul (4.5-11.0)   10/31/17  06:15    


 


RBC  4.07 10^6/uL (3.5-6.1)   10/31/17  06:15    


 


Hgb  13.2 g/dL (14.0-18.0)  L  10/31/17  06:15    


 


Hct  39.2 % (42.0-52.0)  L  10/31/17  06:15    


 


MCV  96.3 fl (80.0-105.0)   10/31/17  06:15    


 


MCH  32.4 pg (25.0-35.0)   10/31/17  06:15    


 


MCHC  33.7 g/dl (31.0-37.0)   10/31/17  06:15    


 


RDW  12.6 % (11.5-14.5)   10/31/17  06:15    


 


Plt Count  222 10^3/uL (120.0-450.0)   10/31/17  06:15    


 


MPV  9.9 fl (7.0-11.0)   10/31/17  06:15    


 


Gran %  85.7 % (50.0-68.0)  H  10/31/17  06:15    


 


Lymph % (Auto)  13.4 % (22.0-35.0)  L  10/31/17  06:15    


 


Mono % (Auto)  0.9 % (1.0-6.0)  L  10/31/17  06:15    


 


Eos % (Auto)  0.0 % (1.5-5.0)  L  10/31/17  06:15    


 


Baso % (Auto)  0.0 % (0.0-3.0)   10/31/17  06:15    


 


Gran #  3.97  (1.4-6.5)   10/31/17  06:15    


 


Lymph #  0.6  (1.2-3.4)  L  10/31/17  06:15    


 


Mono #  0.0  (0.1-0.6)  L  10/31/17  06:15    


 


Eos #  0.0  (0.0-0.7)   10/31/17  06:15    


 


Baso #  0.00 K/mm3 (0.0-2.0)   10/31/17  06:15    


 


PT  12.0 SECONDS (9.4-12.5)   10/30/17  12:00    


 


INR  1.09  (0.93-1.08)  H  10/30/17  12:00    


 


APTT  32.9 Seconds (25.1-36.5)   10/30/17  12:00    


 


D-Dimer, Quantitative  292 ng/mL (0-243)  H  10/30/17  12:00    


 


Sodium  145 mmol/L (132-148)   10/31/17  06:15    


 


Potassium  4.7 mmol/L (3.6-5.0)   10/31/17  06:15    


 


Chloride  106 mmol/L ()   10/31/17  06:15    


 


Carbon Dioxide  27 mmol/L (21-33)   10/31/17  06:15    


 


Anion Gap  17  (10-20)   10/31/17  06:15    


 


BUN  19 mg/dL (7-21)   10/31/17  06:15    


 


Creatinine  1.3 mg/dL (0.8-1.5)   10/31/17  06:15    


 


Est GFR ( Amer)  > 60   10/31/17  06:15    


 


Est GFR (Non-Af Amer)  52   10/31/17  06:15    


 


Random Glucose  124 mg/dL ()  H  10/31/17  06:15    


 


Hemoglobin A1c  5.0 % (4.2-6.5)   10/31/17  06:15    


 


Calcium  10.6 mg/dL (8.4-10.5)  H  10/31/17  06:15    


 


Phosphorus  3.0 mg/dL (2.5-4.5)   10/30/17  05:45    


 


Magnesium  1.8 mg/dL (1.7-2.2)   10/30/17  05:45    


 


Total Bilirubin  0.9 mg/dL (0.2-1.3)   10/31/17  06:15    


 


AST  29 U/L (17-59)   10/31/17  06:15    


 


ALT  32 U/L (7-56)   10/31/17  06:15    


 


Alkaline Phosphatase  44 U/L ()   10/31/17  06:15    


 


Lactate Dehydrogenase  373 U/L (333-699)   10/30/17  02:00    


 


Total Creatine Kinase  101 U/L ()   10/30/17  02:00    


 


Troponin I  < 0.01 ng/mL  10/30/17  18:09    


 


NT-Pro-B Natriuret Pep  99.0 pg/mL (0-450)   10/30/17  02:00    


 


Total Protein  7.6 g/dL (5.8-8.3)   10/31/17  06:15    


 


Albumin  4.2 g/dL (3.0-4.8)   10/31/17  06:15    


 


Globulin  3.4 gm/dL  10/31/17  06:15    


 


Albumin/Globulin Ratio  1.2  (1.1-1.8)   10/31/17  06:15    


 


Triglycerides  50 mg/dL ()   10/31/17  06:15    


 


Cholesterol  209 mg/dL (130-200)  H  10/31/17  06:15    


 


LDL Cholesterol Direct  126 mg/dL (0-129)   10/31/17  06:15    


 


HDL Cholesterol  53 mg/dL (29-60)   10/31/17  06:15    


 


TSH 3rd Generation  0.34 mIU/mL (0.46-4.68)  L  10/31/17  06:15    














- Hospital Course


Hospital Course: 





Mr. Pavon is a 87 year old man with a past medical history of colon cancer (s/

p resection), left eye cancer (s/p excision), BPH, asthma and hypertension who 

comes in complaining of shortness of breath for the past two months.  The 

patient reports coming in today because the shortness of breath became sever 

earlier in the day.  The patient reports taking his albuterol pump however 

denies any improvement in his symptoms.  The patient also reports a recent URI 

about one week ago. The patient also reports chest pain that is located 

bilaterally on his chest with no radiation that started today. 





The patient was transferred to telemetry unit for monitoring. CXR was negative. 

Troponin I negative x3. EKG shows 1st degree AV block but no other changes. LE 

US was negative for DVT. Cardio and Pulm were consulted and recs were followed. 

Patient improved on Xopenex. On morning of d/c, pt was comfortable and not 

experiencing cp, sob, wheezing or any other discomfort. Daughter was contacted 

regarding picking up the patient, and he was discharged on 11/1/17 however he 

could only be picked up today on 11/2/17. Patient will f/u Dr. Hedrick. 





- Date & Time of H&P


Date of H&P: 10/30/17


Time of H&P: 03:38





Discharge Exam





- Additional Findings


Additional findings: 





- Head Exam


Head Exam: ATRAUMATIC, NORMAL INSPECTION, NORMOCEPHALIC





- Eye Exam


Eye Exam: EOMI


Pupil Exam: NORMAL ACCOMODATION


Additional comments: 





Left eye s/p resection, visually impaired b/l





- ENT Exam


ENT Exam: Mucous Membranes Moist, Normal Exam.  absent: Normal Oropharynx, TM's 

Normal Bilaterally


Additional comments: 





Difficulty hearing





- Neck Exam


Neck exam: Positive for: Normal Inspection.  Negative for: Lymphadenopathy, 

Thyromegaly





- Respiratory Exam


Respiratory Exam: Normal Breath Sounds.  absent: Prolonged Expiratory Phase, 

Respiratory Distress, Stridor





- Cardiovascular Exam


Cardiovascular Exam: REGULAR RHYTHM, +S1, +S2.  absent: Gallop, RRR, Rubs





- GI/Abdominal Exam


GI & Abdominal Exam: Normal Bowel Sounds, Soft.  absent: Distended, Organomegaly

, Tenderness





- Extremities Exam


Extremities exam: Positive for: normal inspection.  Negative for: full ROM, 

joint swelling, pedal edema, tenderness





- Back Exam


Back exam: NORMAL INSPECTION.  absent: CVA tenderness (L), CVA tenderness (R), 

paraspinal tenderness





- Neurological Exam


Neurological exam: Oriented x3





- Psychiatric Exam


Psychiatric exam: Normal Affect, Normal Mood





- Skin


Skin Exam: Dry, Intact





Discharge Plan





- Discharge Medications


Prescriptions: 


Albuterol HFA [Ventolin HFA 90 mcg/actuation (8 g)] 1 puff IH Q6 PRN #1 inhaler


 PRN Reason: Shortness Of Breath


Aspirin [Ecotrin] 81 mg PO 0800 #15 tabec


Fluticasone/Vilanterol [Breo Ellipta 100-25 Mcg INH] 1 each IH Q72 30 Days  

blst.w.dev


hydroCHLOROthiazide [Microzide] 12.5 mg PO DAILY #15 cap


Memantine HCl [Namenda Xr] 14 mg PO DAILY #15 cap.spr.24


Methylprednisolone [Medrol Dose Pack (21 tabs)] 4 mg PO DAILY #21 mg


QUEtiapine [Seroquel] 12.5 mg PO HS PRN #30 tab


 PRN Reason: Agitation


Tamsulosin [Flomax] 0.4 mg PO DAILY #30 cap





- Follow Up Plan


Condition: FAIR


Disposition: HOME/ ROUTINE


Instructions:  Chest Pain (DC), COPD (Chronic Obstructive Pulmonary Disease) (

GEN), Dyspnea (GEN)


Additional Instructions: 


1. Follow up with PMD Dr. Matos in 3 days.


2. Follow up with Dr. summers pulmonary in 1 week.


3. Follow up with Dr. morrissey cardiology.


4. Take your medications as prescribed.


Referrals: 


Guille Morrissey MD [Staff Provider] - 


Isai Esparza MD [Primary Care Provider] - 


Declan Summers MD [Staff Provider] - 





<Priscila Pedro - Last Filed: 11/02/17 18:40>





Provider





- Provider


Date of Admission: 


10/31/17 16:46





Attending physician: 


Priscila Pedro MD





Primary care physician: 


Isai Esparza MD








Hospital Course





- Lab Results


Lab Results: 


 Most Recent Lab Values











WBC  4.6 10^3/ul (4.5-11.0)   10/31/17  06:15    


 


RBC  4.07 10^6/uL (3.5-6.1)   10/31/17  06:15    


 


Hgb  13.2 g/dL (14.0-18.0)  L  10/31/17  06:15    


 


Hct  39.2 % (42.0-52.0)  L  10/31/17  06:15    


 


MCV  96.3 fl (80.0-105.0)   10/31/17  06:15    


 


MCH  32.4 pg (25.0-35.0)   10/31/17  06:15    


 


MCHC  33.7 g/dl (31.0-37.0)   10/31/17  06:15    


 


RDW  12.6 % (11.5-14.5)   10/31/17  06:15    


 


Plt Count  222 10^3/uL (120.0-450.0)   10/31/17  06:15    


 


MPV  9.9 fl (7.0-11.0)   10/31/17  06:15    


 


Gran %  85.7 % (50.0-68.0)  H  10/31/17  06:15    


 


Lymph % (Auto)  13.4 % (22.0-35.0)  L  10/31/17  06:15    


 


Mono % (Auto)  0.9 % (1.0-6.0)  L  10/31/17  06:15    


 


Eos % (Auto)  0.0 % (1.5-5.0)  L  10/31/17  06:15    


 


Baso % (Auto)  0.0 % (0.0-3.0)   10/31/17  06:15    


 


Gran #  3.97  (1.4-6.5)   10/31/17  06:15    


 


Lymph #  0.6  (1.2-3.4)  L  10/31/17  06:15    


 


Mono #  0.0  (0.1-0.6)  L  10/31/17  06:15    


 


Eos #  0.0  (0.0-0.7)   10/31/17  06:15    


 


Baso #  0.00 K/mm3 (0.0-2.0)   10/31/17  06:15    


 


PT  12.0 SECONDS (9.4-12.5)   10/30/17  12:00    


 


INR  1.09  (0.93-1.08)  H  10/30/17  12:00    


 


APTT  32.9 Seconds (25.1-36.5)   10/30/17  12:00    


 


D-Dimer, Quantitative  292 ng/mL (0-243)  H  10/30/17  12:00    


 


Sodium  145 mmol/L (132-148)   10/31/17  06:15    


 


Potassium  4.7 mmol/L (3.6-5.0)   10/31/17  06:15    


 


Chloride  106 mmol/L ()   10/31/17  06:15    


 


Carbon Dioxide  27 mmol/L (21-33)   10/31/17  06:15    


 


Anion Gap  17  (10-20)   10/31/17  06:15    


 


BUN  19 mg/dL (7-21)   10/31/17  06:15    


 


Creatinine  1.3 mg/dL (0.8-1.5)   10/31/17  06:15    


 


Est GFR ( Amer)  > 60   10/31/17  06:15    


 


Est GFR (Non-Af Amer)  52   10/31/17  06:15    


 


Random Glucose  124 mg/dL ()  H  10/31/17  06:15    


 


Hemoglobin A1c  5.0 % (4.2-6.5)   10/31/17  06:15    


 


Calcium  10.6 mg/dL (8.4-10.5)  H  10/31/17  06:15    


 


Phosphorus  3.0 mg/dL (2.5-4.5)   10/30/17  05:45    


 


Magnesium  1.8 mg/dL (1.7-2.2)   10/30/17  05:45    


 


Total Bilirubin  0.9 mg/dL (0.2-1.3)   10/31/17  06:15    


 


AST  29 U/L (17-59)   10/31/17  06:15    


 


ALT  32 U/L (7-56)   10/31/17  06:15    


 


Alkaline Phosphatase  44 U/L ()   10/31/17  06:15    


 


Lactate Dehydrogenase  373 U/L (333-699)   10/30/17  02:00    


 


Total Creatine Kinase  101 U/L ()   10/30/17  02:00    


 


Troponin I  < 0.01 ng/mL  10/30/17  18:09    


 


NT-Pro-B Natriuret Pep  99.0 pg/mL (0-450)   10/30/17  02:00    


 


Total Protein  7.6 g/dL (5.8-8.3)   10/31/17  06:15    


 


Albumin  4.2 g/dL (3.0-4.8)   10/31/17  06:15    


 


Globulin  3.4 gm/dL  10/31/17  06:15    


 


Albumin/Globulin Ratio  1.2  (1.1-1.8)   10/31/17  06:15    


 


Triglycerides  50 mg/dL ()   10/31/17  06:15    


 


Cholesterol  209 mg/dL (130-200)  H  10/31/17  06:15    


 


LDL Cholesterol Direct  126 mg/dL (0-129)   10/31/17  06:15    


 


HDL Cholesterol  53 mg/dL (29-60)   10/31/17  06:15    


 


TSH 3rd Generation  0.34 mIU/mL (0.46-4.68)  L  10/31/17  06:15    














Attending/Attestation





- Attestation


I have personally seen and examined this patient.: Yes


I have fully participated in the care of the patient.: Yes


I have reviewed all pertinent clinical information, including history, physical 

exam and plan: Yes


Notes (Text): 





11/02/17 18:39








Attending note;





Patient seen and examined with resident.





Patient is complaining of cough and shortness of breath on exertion/COPD 

exacerbation.


Chest pain resolved. cardiac enzyme negative.





Treated  oxygen when necessary, IV solumedrol.


 physical therapy evaluation appreciated.





Pulmonary and cardiology evaluation appreciated.





we will discharge the patient home with Medrol Dosepak, albuterol inhaler.





family was not available  yesterday to discharge patient home.





patient will be discharged with Daughter today.





Upon discharge the patient will follow-up with PMD Dr. Matos.





diagnosis;


COPD exacerbation


Chest pain


Left eye blindness


History of colon cancer

## 2017-11-16 ENCOUNTER — HOSPITAL ENCOUNTER (OUTPATIENT)
Dept: HOSPITAL 42 - ED | Age: 82
Setting detail: OBSERVATION
LOS: 1 days | Discharge: HOME | End: 2017-11-17
Attending: INTERNAL MEDICINE | Admitting: INTERNAL MEDICINE
Payer: MEDICARE

## 2017-11-16 VITALS — BODY MASS INDEX: 29.5 KG/M2

## 2017-11-16 DIAGNOSIS — Z85.840: ICD-10-CM

## 2017-11-16 DIAGNOSIS — N40.0: ICD-10-CM

## 2017-11-16 DIAGNOSIS — Z85.038: ICD-10-CM

## 2017-11-16 DIAGNOSIS — R07.89: Primary | ICD-10-CM

## 2017-11-16 DIAGNOSIS — H54.8: ICD-10-CM

## 2017-11-16 DIAGNOSIS — F03.90: ICD-10-CM

## 2017-11-16 DIAGNOSIS — J44.1: ICD-10-CM

## 2017-11-16 DIAGNOSIS — Z87.891: ICD-10-CM

## 2017-11-16 DIAGNOSIS — I08.3: ICD-10-CM

## 2017-11-16 DIAGNOSIS — D64.9: ICD-10-CM

## 2017-11-16 DIAGNOSIS — M54.2: ICD-10-CM

## 2017-11-16 DIAGNOSIS — I10: ICD-10-CM

## 2017-11-16 LAB
ALBUMIN/GLOB SERPL: 1.3 {RATIO} (ref 1.1–1.8)
ALP SERPL-CCNC: 37 U/L (ref 38–126)
ALT SERPL-CCNC: 34 U/L (ref 7–56)
APPEARANCE UR: CLEAR
APTT BLD: 33.3 SECONDS (ref 25.1–36.5)
AST SERPL-CCNC: 19 U/L (ref 17–59)
BASE EXCESS BLDV CALC-SCNC: 4 MMOL/L (ref 0–2)
BASE EXCESS BLDV CALC-SCNC: 5.3 MMOL/L (ref 0–2)
BASOPHILS # BLD AUTO: 0.01 K/MM3 (ref 0–2)
BASOPHILS NFR BLD: 0.2 % (ref 0–3)
BILIRUB SERPL-MCNC: 0.8 MG/DL (ref 0.2–1.3)
BILIRUB UR-MCNC: NEGATIVE MG/DL
BUN SERPL-MCNC: 17 MG/DL (ref 7–21)
CALCIUM SERPL-MCNC: 9.6 MG/DL (ref 8.4–10.5)
CHLORIDE SERPL-SCNC: 104 MMOL/L (ref 98–107)
CO2 SERPL-SCNC: 32 MMOL/L (ref 21–33)
COLOR UR: YELLOW
D DIMER PPP FEU-MCNC: 533 NG/ML (ref 0–243)
EOSINOPHIL # BLD: 0.1 10*3/UL (ref 0–0.7)
EOSINOPHIL NFR BLD: 3.2 % (ref 1.5–5)
ERYTHROCYTE [DISTWIDTH] IN BLOOD BY AUTOMATED COUNT: 12.6 % (ref 11.5–14.5)
GLOBULIN SER-MCNC: 2.9 GM/DL
GLUCOSE SERPL-MCNC: 89 MG/DL (ref 70–110)
GLUCOSE UR STRIP-MCNC: NEGATIVE MG/DL
GRANULOCYTES # BLD: 2.09 10*3/UL (ref 1.4–6.5)
GRANULOCYTES NFR BLD: 51.7 % (ref 50–68)
HCT VFR BLD CALC: 35.9 % (ref 42–52)
INR PPP: 0.97 (ref 0.93–1.08)
KETONES UR STRIP-MCNC: NEGATIVE MG/DL
LEUKOCYTE ESTERASE UR-ACNC: NEGATIVE LEU/UL
LYMPHOCYTES # BLD: 1.5 10*3/UL (ref 1.2–3.4)
LYMPHOCYTES NFR BLD AUTO: 37.5 % (ref 22–35)
MAGNESIUM SERPL-MCNC: 1.8 MG/DL (ref 1.7–2.2)
MCH RBC QN AUTO: 32.6 PG (ref 25–35)
MCHC RBC AUTO-ENTMCNC: 33.1 G/DL (ref 31–37)
MCV RBC AUTO: 98.4 FL (ref 80–105)
MONOCYTES # BLD AUTO: 0.3 10*3/UL (ref 0.1–0.6)
MONOCYTES NFR BLD: 7.4 % (ref 1–6)
PH BLDV: 7.3 [PH] (ref 7.32–7.43)
PH BLDV: 7.32 [PH] (ref 7.32–7.43)
PH UR STRIP: 6.5 [PH] (ref 4.7–8)
PLATELET # BLD: 181 10^3/UL (ref 120–450)
PMV BLD AUTO: 9.7 FL (ref 7–11)
POTASSIUM SERPL-SCNC: 3.8 MMOL/L (ref 3.6–5)
PROT SERPL-MCNC: 6.5 G/DL (ref 5.8–8.3)
PROT UR STRIP-MCNC: NEGATIVE MG/DL
RBC # UR STRIP: (no result) /UL
SODIUM SERPL-SCNC: 141 MMOL/L (ref 132–148)
SP GR UR STRIP: 1.01 (ref 1–1.03)
TROPONIN I SERPL-MCNC: < 0.01 NG/ML
TROPONIN I SERPL-MCNC: < 0.01 NG/ML
UROBILINOGEN UR STRIP-ACNC: 1 E.U./DL
WBC # BLD AUTO: 4.1 10^3/UL (ref 4.5–11)
WBC #/AREA URNS HPF: (no result) /HPF (ref 0–6)

## 2017-11-16 PROCEDURE — 83615 LACTATE (LD) (LDH) ENZYME: CPT

## 2017-11-16 PROCEDURE — 71020: CPT

## 2017-11-16 PROCEDURE — 97530 THERAPEUTIC ACTIVITIES: CPT

## 2017-11-16 PROCEDURE — 99284 EMERGENCY DEPT VISIT MOD MDM: CPT

## 2017-11-16 PROCEDURE — 97165 OT EVAL LOW COMPLEX 30 MIN: CPT

## 2017-11-16 PROCEDURE — 85378 FIBRIN DEGRADE SEMIQUANT: CPT

## 2017-11-16 PROCEDURE — 82803 BLOOD GASES ANY COMBINATION: CPT

## 2017-11-16 PROCEDURE — 83880 ASSAY OF NATRIURETIC PEPTIDE: CPT

## 2017-11-16 PROCEDURE — 81001 URINALYSIS AUTO W/SCOPE: CPT

## 2017-11-16 PROCEDURE — 85610 PROTHROMBIN TIME: CPT

## 2017-11-16 PROCEDURE — 82550 ASSAY OF CK (CPK): CPT

## 2017-11-16 PROCEDURE — 87086 URINE CULTURE/COLONY COUNT: CPT

## 2017-11-16 PROCEDURE — 87040 BLOOD CULTURE FOR BACTERIA: CPT

## 2017-11-16 PROCEDURE — 97116 GAIT TRAINING THERAPY: CPT

## 2017-11-16 PROCEDURE — 94640 AIRWAY INHALATION TREATMENT: CPT

## 2017-11-16 PROCEDURE — 85730 THROMBOPLASTIN TIME PARTIAL: CPT

## 2017-11-16 PROCEDURE — 85027 COMPLETE CBC AUTOMATED: CPT

## 2017-11-16 PROCEDURE — 93005 ELECTROCARDIOGRAM TRACING: CPT

## 2017-11-16 PROCEDURE — 71275 CT ANGIOGRAPHY CHEST: CPT

## 2017-11-16 PROCEDURE — 80053 COMPREHEN METABOLIC PANEL: CPT

## 2017-11-16 PROCEDURE — 83735 ASSAY OF MAGNESIUM: CPT

## 2017-11-16 PROCEDURE — 85025 COMPLETE CBC W/AUTO DIFF WBC: CPT

## 2017-11-16 PROCEDURE — 74175 CTA ABDOMEN W/CONTRAST: CPT

## 2017-11-16 PROCEDURE — 97162 PT EVAL MOD COMPLEX 30 MIN: CPT

## 2017-11-16 PROCEDURE — 36415 COLL VENOUS BLD VENIPUNCTURE: CPT

## 2017-11-16 PROCEDURE — 84484 ASSAY OF TROPONIN QUANT: CPT

## 2017-11-16 RX ADMIN — METHYLPREDNISOLONE SODIUM SUCCINATE SCH MG: 40 INJECTION, POWDER, FOR SOLUTION INTRAMUSCULAR; INTRAVENOUS at 22:00

## 2017-11-16 RX ADMIN — PANTOPRAZOLE SODIUM SCH MG: 40 TABLET, DELAYED RELEASE ORAL at 17:24

## 2017-11-16 NOTE — ED PDOC
Arrival/HPI





- General


Time Seen by Provider: 11/16/17 13:28





- Critical Care


Narrative Critical Care (Text): 


 86 y/o woman w/ pmhx of colon ca s/p resection, left eye cancer s/p excision, 

htn, copd/bronchial asthma, recent visit for copd exacerbation with MI ruled 

out w/ serial ce's/ekg's/cardiology consulatation . rt sided subclavian 

portocath? presents c/o sudden onset left sdied chest pain, raitiing through 

left neck/left arm, associated w/ mild dizziness but no cold diaphoresis/

palpitations/ n/v/d/recent fevers. ROS (+) chronic cough productive fo whitish 

yellowish mucoid expectoration . 


11/16/17 13:34








Past Medical History





- Provider Review


Nursing Documentation Reviewed: Yes





- Infectious Disease


Hx of Infectious Diseases: None





- Tetanus Immunization


Tetanus Immunization: Unknown





- Cardiac


Hx Hypertension: Yes





- Pulmonary


Hx Respiratory Disorders: No





- Neurological


Hx Neurological Disorder: No





- HEENT


Hx HEENT Disorder: Yes


Hx Blind: Yes (left eye cancer)


Hx Deafness: Yes (Hard-of-hearing)


Hx Glaucoma: Yes (right eye)





- Renal


Hx Renal Disorder: No





- Endocrine/Metabolic


Hx Endocrine Disorders: No





- Hematological/Oncological


Hx Blood Disorders: No


Hx Cancer: Yes (Colon ca s/p resection)





- Integumentary


Hx Dermatological Disorder: No





- Musculoskeletal/Rheumatological


Hx Musculoskeletal Disorders: No


Hx Falls: Yes





- Gastrointestinal


Hx Gastrointestinal Disorders: No





- Genitourinary/Gynecological


Hx Genitourinary Disorders: Yes


Hx Prostate Problems: Yes (BPH)





- Psychiatric


Hx Psychophysiologic Disorder: No


Hx Depression: No


Hx Emotional Abuse: No


Hx Physical Abuse: No


Hx Substance Use: No





- Surgical History


Other/Comment: colon resection; left eye removal





- Anesthesia


Hx Anesthesia: Yes


Hx Anesthesia Reactions: No


Hx Malignant Hyperthermia: No





- Suicidal Assessment


Feels Threatened In Home Enviroment: No





Family/Social History





- Physician Review


Nursing Documentation Reviewed: Yes


Family/Social History: No Known Family HX


Smoking Status: Former Smoker


Hx Alcohol Use: Yes (occasional)


Hx Substance Use: No


Hx Substance Use Treatment: No





Allergies/Home Meds


Allergies/Adverse Reactions: 


Allergies





No Known Allergies Allergy (Verified 10/30/17 01:17)


 











Review of Systems





- Physician Review


All systems were reviewed & negative as marked: Yes





- Review of Systems


Constitutional: Normal


Eyes: Normal


ENT: Normal


Respiratory: Cough


Cardiovascular: Chest Pain


Gastrointestinal: Normal


Genitourinary Male: Normal


Musculoskeletal: Normal


Skin: Normal


Neurological: Normal


Endocrine: Normal


Hemo/Lymphatic: Normal


Psychiatric: Normal





Physical Exam


Vital Signs Reviewed: Yes


Vital Signs











  Pulse Resp BP Pulse Ox


 


 11/16/17 13:25  73  18  148/71  99











Temperature: Afebrile


Blood Pressure: Normal


Pulse: Regular


Respiratory Rate: Normal


Appearance: Positive for: Well-Appearing, Non-Toxic, Comfortable


Pain Distress: None


Mental Status: Positive for: other (alert and oriented x 2 )





- Systems Exam


Head: Present: Atraumatic, Normocephalic


Pupils: Present: PERRL


Extroacular Muscles: Present: EOMI


Conjunctiva: Present: Normal


Mouth: Present: Moist Mucous Membranes


Neck: Present: Normal Range of Motion


Respiratory/Chest: Present: Clear to Auscultation, Good Air Exchange, Other (

occasional rhonchi, reduced bs bibsailarly ).  No: Respiratory Distress, 

Accessory Muscle Use


Cardiovascular: Present: Regular Rate and Rhythm, Normal S1, S2, Other (neck 

pain  somewhat reproducible in interscalene/inter scm region, rt sided palpable 

subclavian cord ).  No: Murmurs


Abdomen: Present: Normal Bowel Sounds, Other (soft, nt, nd).  No: Tenderness, 

Distention, Peritoneal Signs


Back: Present: Normal Inspection


Upper Extremity: Present: Normal Inspection.  No: Cyanosis, Edema


Lower Extremity: Present: Normal Inspection.  No: Edema


Neurological: Present: GCS=15, CN II-XII Intact, Speech Normal, Motor Func 

Grossly Intact, Normal Sensory Function, Normal Cerebellar Funct, Norm Deep 

Tendon Reflexes, Gait Normal, Memory Normal, Normal 2Pt Descrimination


Skin: Present: Warm, Dry, Normal Color.  No: Rashes


Psychiatric: Present: Alert, Normal Insight, Normal Concentration, Other (

oriented x 2 to person and place )





Medical Decision Making


ED Course and Treatment: 


ekg nsr @ 72 bpm w/ concomitant 1st deg av block , rbbb, biphasic initially 

downsloping st-t segments


11/16/17 13:40





11/16/17 13:41


86 y/o male w/ aforementioned comorbidities , 5/17 abnormal ttecho/stress test 

, recent hospitalization, indwelling rt sixded subclavian cath?? p/w sudden 

onset cp/dizziness somewhat reproducible but atraumatic 





cp r/o acs : serial ce's ekg's cxr


cp r/o pe/dissection - d-dimer likley ct angio chest if GFR can tolerate 





observation for definitive cardiac risk stratification. 





Report Date : 11/16/2017 13:57:00


Procedure: Chest xray


Dictator : Raudel Blanc MD


IMPRESSION: Moderate constipation











11/16/17 15:28


d-dimer does not meet age adjusted positive cutoff, nonetheless pt suficiently 

high risk , given recent hospital admission/hx of malignancy to warant ct angio 

r/o pe, given radition to neck, dissection protocol will be used which will not 

miss pe.  





- Lab Interpretations


Lab Results: 








 11/16/17 13:55 





 11/16/17 13:55 





 Lab Results





11/16/17 13:55: Sodium 141, Chloride 104, Potassium 3.8, Carbon Dioxide 32, 

Anion Gap 9 L, BUN 17, Creatinine 1.3, Est GFR (African Amer) > 60, Est GFR (Non

-Af Amer) 52, Random Glucose 89, Calcium 9.6, Magnesium 1.8, Total Bilirubin 0.8

, AST 19, ALT 34, Alkaline Phosphatase 37 L, Lactate Dehydrogenase 353, Total 

Creatine Kinase 62, Troponin I < 0.01, NT-Pro-B Natriuret Pep 72.4, Total 

Protein 6.5, Albumin 3.6, Globulin 2.9, Albumin/Globulin Ratio 1.3


11/16/17 13:55: pO2 38, VBG pH 7.30 L, VBG pCO2 66.0 H*, VBG HCO3 32.5 H, VBG 

Total CO2 34.5 H, VBG O2 Sat (Calc) 72.3 H, VBG Base Excess 4.0 H, VBG 

Potassium 3.6, Sodium 141.0, Chloride 106.0, Glucose 90, Lactate 2.2 H, FiO2 

21.0, Venous Blood Potassium 3.6


11/16/17 13:55: WBC 4.1 L, RBC 3.65, Hgb 11.9 L, Hct 35.9 L, MCV 98.4, MCH 32.6

, MCHC 33.1, RDW 12.6, Plt Count 181, MPV 9.7, Gran % 51.7, Lymph % (Auto) 37.5 

H, Mono % (Auto) 7.4 H, Eos % (Auto) 3.2, Baso % (Auto) 0.2, Gran # 2.09, Lymph 

# 1.5, Mono # 0.3, Eos # 0.1, Baso # 0.01


11/16/17 13:55: PT 10.6, INR 0.97, APTT 33.3, D-Dimer, Quantitative 533 H











- RAD Interpretation


Radiology Orders: 








11/16/17 13:28


CHEST TWO VIEWS (PA/LAT) [RAD] Stat 














- Medication Orders


Current Medication Orders: 











Discontinued Medications





Aspirin (Ecotrin)  325 mg PO STAT STA


   Stop: 11/16/17 14:22


   Last Admin: 11/16/17 14:29  Dose: 325 mg











Disposition/Present on Arrival





- Present on Arrival


Any Indicators Present on Arrival: No


History of DVT/PE: No


History of Uncontrolled Diabetes: No


Urinary Catheter: No


History of Decub. Ulcer: No


History Surgical Site Infection Following: None





- Disposition


Have Diagnosis and Disposition been Completed?: Yes


Diagnosis: 


 Chest pain





Disposition: HOSPITALIZED


Disposition Time: 15:30


Patient Plan: Admission


Condition: FAIR


Discharge Instructions (ExitCare):  Chest Pain (ED)


Referrals: 


Ramiro Saeed, [Primary Care Provider] - Follow up with primary

## 2017-11-16 NOTE — RAD
HISTORY:

cp  



COMPARISON:

10/30/2017



TECHNIQUE:

Chest PA and lateral



FINDINGS:



LUNGS:

No active pulmonary disease.



PLEURA:

No significant pleural effusion identified. No pneumothorax apparent.



CARDIOVASCULAR:

Normal.



OSSEOUS STRUCTURES:

No significant abnormalities.



VISUALIZED UPPER ABDOMEN:

Normal.



OTHER FINDINGS:

None.



IMPRESSION:

No active disease.

## 2017-11-16 NOTE — CARD
--------------- APPROVED REPORT --------------





EKG Measurement

Heart [id number]

MA [id number]

[id number]

[id number]

[id number]



<Conclusion>

Sinus rhythm with 1st degree AV block with premature supraventricular 

complexes

Right bundle branch block

Abnormal ECG

## 2017-11-16 NOTE — CT
CTA chest, abdomen, and pelvis with IV contrast 



Indication: r/o dissection vs pe



Technique: 



Contiguous axial images of the chest, abdomen, and pelvis. Coronal 

and Sagittal reformats generated and reviewed. 



This CT exam was performed using 1 or more of the following dose 

reduction techniques: Automated exposure control, adjustment of the 

MAA and/or kV according to patient size, and/or use of iterative 

reconstruction technique. 



Contrast: 144 cc Omnipaque 350 



Radiation dose:



Total exam DLP = 1742.96 MGy-cm.



Comparison: CT abdomen pelvis without contrast performed 9/15/16 



Findings: 



Right-sided MediPort extends to the SVC. Visualized portions of the 

inferior thyroid gland heterogeneous with tiny bilateral hypodense 

nodules. The mediastinal and hilar vascular structures appear within 

normal limits.  The heart appears within normal limits of size. 



No large central or segmental pulmonary embolus evident.  No focal 

consolidation. No pleural effusion.  No pneumothorax.  No suspicious 

pulmonary nodules measuring greater than 5 mm. 



Fatty atrophy of the pancreas. Fluid and debris within the stomach. 

Bilateral renal cysts. The liver, spleen, pancreas, adrenal glands, 

and gallbladder appear unremarkable. 



Moderate constipation. The bowel loops appear within normal limits of 

caliber without evidence of intestinal obstruction.  The appendix 

appears within normal limits of caliber. No secondary signs of acute 

appendicitis. There is no definite free air. 



The prostate gland measures approximately 5.2 x 6.2 cm. The urinary 

bladder appears unremarkable. 



Degenerative changes.  Facet hypertrophy. 



Impression: 



No large central or segmental pulmonary embolus identified. 



No evidence of aortic aneurysm or dissection. 



Enlarged prostate gland.  Recommend correlation with PSA. 



Additional incidental findings as above.

## 2017-11-16 NOTE — CP.PCM.HP
Addendum entered and electronically signed by Claus Whittaker DO  11/16/17 19:45

: 





Correction to assessment: 


1. Neck/LUE pain likely musculoskeletal but need to r/o ACS due to risk factors 

of age, gender, Hx HTN, ethnicity and former smoker. will trend troponin I. 

Rest of assessments stand as is. 





Original Note:








<Claus Whittaker - Last Filed: 11/16/17 17:37>





History of Present Illness





- History of Present Illness


History of Present Illness: 





Mr. Pavon is a 88 y/o male w/ a PMHx of COPD, colon cancer s/p resection, left 

eye ca s/p excision, BPH, and HTN who presents w/ intermittent left-sided neck 

pain which radiates down his left arm and left upper back for the past 2 hours. 

Daughter, Kandi, is bedside and provides more information about patient. Pt 

stated that the pain is sharp and electric and worsens when he moves, sits up 

or strains his neck. Pt lives at home with daughter and attends a daily adult 

 center and states that the pain began right after he turned his neck. 

Pt also c/o associated left arm numbness and tingling and pain in the left 

supraclavicular area. Pt states that he did not follow up with Dr. Hedrick 

after recent discharge. Pt denies any SOB, fever, chills, night sweats, N/V/D, 

abd pain, dysphagia, hematurea or dysurea. 12-pt ROS was reviewed and is 

otherwise unremarkable.





PMD: Ryley





PMHx: Colon CA, Left eye CA, COPD, HTN, inguinal hernia


PSHx: Colon cancer resection, left eye excision, inguinal hernia repair


Medications: Hydrochlorothiazide, Tamsulosin, Seroquel, Medrol, Namenda, ASA, 

Albuterol, Fluticasone/Vilanterol


Allergies: NKDA


SHx: Denies any illicit drug use, former smoker - PPD for 57 years, quit in 

2000. Pt admits to drinking whiskey 2-3 times a month. lives with daughterKandi 





Present on Admission





- Present on Admission


Any Indicators Present on Admission: No


History of DVT/PE: No


History of Uncontrolled Diabetes: No


Urinary Catheter: No


Decubitus Ulcer Present: No





Review of Systems





- Review of Systems


All systems: reviewed and no additional remarkable complaints except (as per HPI

)





Past Patient History





- Infectious Disease


Hx of Infectious Diseases: None





- Tetanus Immunizations


Tetanus Immunization: Unknown





- Past Social History


Smoking Status: Former Smoker


Alcohol: None


Drugs: Denies


Home Situation {Lives}: With Family





- CARDIAC


Hx Hypertension: Yes





- PULMONARY


Hx Respiratory Disorders: Yes


Hx Chronic Obstructive Pulmonary Disease (COPD): Yes





- NEUROLOGICAL


Hx Neurological Disorder: No





- HEENT


Hx HEENT Problems: Yes


Hx Blind: Yes (left eye cancer)


Hx Deafness: Yes (Hard-of-hearing)


Hx Glaucoma: Yes (right eye)





- RENAL


Hx Chronic Kidney Disease: No





- ENDOCRINE/METABOLIC


Hx Endocrine Disorders: No





- HEMATOLOGICAL/ONCOLOGICAL


Hx Blood Disorders: No


Hx Cancer: Yes (Colon ca s/p resection)





- INTEGUMENTARY


Hx Dermatological Problems: No





- MUSCULOSKELETAL/RHEUMATOLOGICAL


Hx Musculoskeletal Disorders: No


Hx Falls: Yes





- GASTROINTESTINAL


Hx Gastrointestinal Disorders: No





- GENITOURINARY/GYNECOLOGICAL


Hx Genitourinary Disorders: Yes


Hx Prostate Problems: Yes (BPH)





- PSYCHIATRIC


Hx Psychophysiologic Disorder: No


Hx Depression: No


Hx Emotional Abuse: No


Hx Physical Abuse: No


Hx Substance Use: No





- SURGICAL HISTORY


Other/Comment: colon resection; left eye removal





- ANESTHESIA


Hx Anesthesia: Yes


Hx Anesthesia Reactions: No


Hx Malignant Hyperthermia: No





Meds


Allergies/Adverse Reactions: 


 Allergies











Allergy/AdvReac Type Severity Reaction Status Date / Time


 


No Known Allergies Allergy   Verified 11/16/17 19:55














Physical Exam





- Constitutional


Appears: Well, Non-toxic, No Acute Distress





- Head Exam


Head Exam: ATRAUMATIC, NORMAL INSPECTION, NORMOCEPHALIC





- Eye Exam


Additional comments: 





L eye s/p excision


R eye poor vision








- ENT Exam


ENT Exam: Mucous Membranes Moist, Normal Exam





- Neck Exam


Neck exam: Positive for: Normal Inspection, Tenderness (w/ palpation R side)





- Respiratory Exam


Respiratory Exam: Rhonchi (mild diffuse), NORMAL BREATHING PATTERN.  absent: 

Rales, Wheezes





- Cardiovascular Exam


Cardiovascular Exam: RRR, +S1, +S2.  absent: Gallop, JVD, Rubs, Systolic Murmur





- GI/Abdominal Exam


GI & Abdominal Exam: Normal Bowel Sounds, Soft.  absent: Distended, Tenderness





- Extremities Exam


Extremities exam: Positive for: normal inspection, pedal edema (1+)





- Back Exam


Back exam: NORMAL INSPECTION





- Neurological Exam


Neurological exam: Alert, Oriented x3





- Psychiatric Exam


Psychiatric exam: Normal Affect, Normal Mood





- Skin


Skin Exam: Normal Color, Warm





Results





- Vital Signs


Recent Vital Signs: 





 Last Vital Signs











Temp      


 


Pulse  77   11/16/17 16:17


 


Resp  16   11/16/17 16:17


 


BP  141/77   11/16/17 16:17


 


Pulse Ox  96   11/16/17 16:17














- Labs


Result Diagrams: 


 11/16/17 13:55





 11/16/17 13:55


Labs: 





 Laboratory Results - last 24 hr











  11/16/17





  15:33


 


Urine Color  Yellow


 


Urine Appearance  Clear


 


Urine pH  6.5


 


Ur Specific Gravity  1.010


 


Urine Protein  Negative


 


Urine Glucose (UA)  Negative


 


Urine Ketones  Negative


 


Urine Blood  Trace-intact H


 


Urine Nitrate  Negative


 


Urine Bilirubin  Negative


 


Urine Urobilinogen  1.0 H


 


Ur Leukocyte Esterase  Negative


 


Urine RBC  2 - 5


 


Urine WBC  0 - 2














Assessment & Plan





- Assessment and Plan (Free Text)


Assessment: 





87 year old male with a past medical history of colon cancer(s/p resection), 

left eye cancer (s/p excision), bph and hypertension who is being treated for 

acute COPD exacerbation





Plan: 





1. COPD exacerbation


- Duonebs PRN


- O2Sat good off O2 


- cont solu-medrol 20mg q12


- elevated d-dimer


- f/u CTA chest


- Pulm consulted, recs appreciated


- Cardio consulted, recs appreciated 


- PT/OT eval 





2.Hypertension


- cont HCTZ 





3.BPH


- cont Flomax 





4. h/o Colon cancer


- no acute intervention indicated at this time


- Will monitor closely for any changes in H/H





5. h/o Left eye cancer


- No acute intervention indicated at this time.


- staff is informed that pt is visually impaired 





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


- cont home dose seroquel and memantine





PTX/Heparin


HHD





Patient was seen, examined and discussed with attending, Dr. Willis Whittaker PGY1








- Date & Time


Date: 11/16/17


Time: 17:38





<Priscila Pedro - Last Filed: 11/17/17 15:59>





Results





- Vital Signs


Recent Vital Signs: 





 Last Vital Signs











Temp  98.6 F   11/17/17 11:46


 


Pulse  67   11/17/17 11:46


 


Resp  16   11/17/17 11:46


 


BP  145/81   11/17/17 11:46


 


Pulse Ox  98   11/17/17 11:46














- Labs


Result Diagrams: 


 11/17/17 06:20





 11/17/17 06:20


Labs: 





 Laboratory Results - last 24 hr











  11/16/17 11/16/17 11/16/17





  15:33 18:21 21:27


 


WBC   


 


RBC   


 


Hgb   


 


Hct   


 


MCV   


 


MCH   


 


MCHC   


 


RDW   


 


Plt Count   


 


MPV   


 


PT   


 


INR   


 


pO2   23 L 


 


VBG pH   7.32 


 


VBG pCO2   65.0 H 


 


VBG HCO3   33.5 H 


 


VBG Total CO2   35.5 H 


 


VBG O2 Sat (Calc)   47.5 


 


VBG Base Excess   5.3 H 


 


VBG Potassium   4.0 


 


Sodium   139.0 


 


Chloride   105.0 


 


Glucose   94 


 


Lactate   1.0 


 


FiO2   21.0 


 


Potassium   


 


Carbon Dioxide   


 


Anion Gap   


 


BUN   


 


Creatinine   


 


Est GFR ( Amer)   


 


Est GFR (Non-Af Amer)   


 


Random Glucose   


 


Calcium   


 


Total Bilirubin   


 


AST   


 


ALT   


 


Alkaline Phosphatase   


 


Lactate Dehydrogenase    354


 


Total Creatine Kinase    53


 


Troponin I    < 0.01


 


Total Protein   


 


Albumin   


 


Globulin   


 


Albumin/Globulin Ratio   


 


Venous Blood Potassium   4.0 


 


Urine Color  Yellow  


 


Urine Appearance  Clear  


 


Urine pH  6.5  


 


Ur Specific Gravity  1.010  


 


Urine Protein  Negative  


 


Urine Glucose (UA)  Negative  


 


Urine Ketones  Negative  


 


Urine Blood  Trace-intact H  


 


Urine Nitrate  Negative  


 


Urine Bilirubin  Negative  


 


Urine Urobilinogen  1.0 H  


 


Ur Leukocyte Esterase  Negative  


 


Urine RBC  2 - 5  


 


Urine WBC  0 - 2  














  11/17/17 11/17/17 11/17/17





  06:20 06:20 06:20


 


WBC  3.6 L  


 


RBC  3.99  


 


Hgb  13.1 L  


 


Hct  38.6 L  


 


MCV  96.7  


 


MCH  32.8  


 


MCHC  33.9  


 


RDW  12.4  


 


Plt Count  198  


 


MPV  10.0  


 


PT   10.6 


 


INR   0.96 


 


pO2   


 


VBG pH   


 


VBG pCO2   


 


VBG HCO3   


 


VBG Total CO2   


 


VBG O2 Sat (Calc)   


 


VBG Base Excess   


 


VBG Potassium   


 


Sodium    141


 


Chloride    107


 


Glucose   


 


Lactate   


 


FiO2   


 


Potassium    4.5


 


Carbon Dioxide    28


 


Anion Gap    11


 


BUN    16


 


Creatinine    1.3


 


Est GFR ( Amer)    > 60


 


Est GFR (Non-Af Amer)    52


 


Random Glucose    116 H


 


Calcium    9.9


 


Total Bilirubin    1.1


 


AST    25


 


ALT    24


 


Alkaline Phosphatase    48


 


Lactate Dehydrogenase    397


 


Total Creatine Kinase    69


 


Troponin I    < 0.01


 


Total Protein    7.4


 


Albumin    4.0


 


Globulin    3.4


 


Albumin/Globulin Ratio    1.2


 


Venous Blood Potassium   


 


Urine Color   


 


Urine Appearance   


 


Urine pH   


 


Ur Specific Gravity   


 


Urine Protein   


 


Urine Glucose (UA)   


 


Urine Ketones   


 


Urine Blood   


 


Urine Nitrate   


 


Urine Bilirubin   


 


Urine Urobilinogen   


 


Ur Leukocyte Esterase   


 


Urine RBC   


 


Urine WBC   














Attending/Attestation





- Attestation


I have personally seen and examined this patient.: Yes


I have fully participated in the care of the patient.: Yes


I have reviewed all pertinent clinical information: Yes


Notes (Text): 





11/17/17 15:58





Attending note;





Patient seen and examined with resident in ER.


Patient's daughter by the bedside.





Patient is a87-year-old male with a past medical history  of COPD, colon cancer 

s/p resection, left eye cancer s/p excision, BPH, and HTN who presents with 

intermittent left-sided neck pain which radiates down his left arm.


Most likely musculoskeletal pain.


Will rule out ACS.


Admit to telemetry. Cardiac enzymes 3 ordered. Cardiology evaluation requested.





COPD; continue DuoNeb treatment.





Possible discharge home tomorrow if clinically stable.





Upon discharge the patient will follow up with PMD .

## 2017-11-17 VITALS
TEMPERATURE: 98.6 F | OXYGEN SATURATION: 98 % | SYSTOLIC BLOOD PRESSURE: 145 MMHG | DIASTOLIC BLOOD PRESSURE: 81 MMHG | RESPIRATION RATE: 16 BRPM | HEART RATE: 67 BPM

## 2017-11-17 LAB
ALBUMIN/GLOB SERPL: 1.2 {RATIO} (ref 1.1–1.8)
ALP SERPL-CCNC: 48 U/L (ref 38–126)
ALT SERPL-CCNC: 24 U/L (ref 7–56)
AST SERPL-CCNC: 25 U/L (ref 17–59)
BILIRUB SERPL-MCNC: 1.1 MG/DL (ref 0.2–1.3)
BUN SERPL-MCNC: 16 MG/DL (ref 7–21)
CALCIUM SERPL-MCNC: 9.9 MG/DL (ref 8.4–10.5)
CHLORIDE SERPL-SCNC: 107 MMOL/L (ref 98–107)
CO2 SERPL-SCNC: 28 MMOL/L (ref 21–33)
ERYTHROCYTE [DISTWIDTH] IN BLOOD BY AUTOMATED COUNT: 12.4 % (ref 11.5–14.5)
GLOBULIN SER-MCNC: 3.4 GM/DL
GLUCOSE SERPL-MCNC: 116 MG/DL (ref 70–110)
HCT VFR BLD CALC: 38.6 % (ref 42–52)
INR PPP: 0.96 (ref 0.93–1.08)
MCH RBC QN AUTO: 32.8 PG (ref 25–35)
MCHC RBC AUTO-ENTMCNC: 33.9 G/DL (ref 31–37)
MCV RBC AUTO: 96.7 FL (ref 80–105)
PLATELET # BLD: 198 10^3/UL (ref 120–450)
PMV BLD AUTO: 10 FL (ref 7–11)
POTASSIUM SERPL-SCNC: 4.5 MMOL/L (ref 3.6–5)
PROT SERPL-MCNC: 7.4 G/DL (ref 5.8–8.3)
SODIUM SERPL-SCNC: 141 MMOL/L (ref 132–148)
TROPONIN I SERPL-MCNC: < 0.01 NG/ML
WBC # BLD AUTO: 3.6 10^3/UL (ref 4.5–11)

## 2017-11-17 RX ADMIN — PANTOPRAZOLE SODIUM SCH MG: 40 TABLET, DELAYED RELEASE ORAL at 10:16

## 2017-11-17 RX ADMIN — METHYLPREDNISOLONE SODIUM SUCCINATE SCH MG: 40 INJECTION, POWDER, FOR SOLUTION INTRAMUSCULAR; INTRAVENOUS at 10:16

## 2017-11-17 NOTE — CON
PULMONARY CONSULTATION



REASON FOR CONSULTATION:  Chest pain.



REFERRING PHYSICIAN:  Dr. Pedro.



HISTORY OF PRESENT ILLNESS:  The patient is an 87-year-old male, with past

medical history significant for chronic obstructive pulmonary disease,

positive extensive smoking history, colon cancer, status post resective

surgery, left eye surgery, legal blindness, who presents to [facility] with acute onset of left-sided chest pain.  The patient is not short

of breath at rest.  He does have chronic dyspnea on exertion-which has not

changed.  He also has a chronic minimal cough with occasional sputum

production-also unchanged.  There is no history of coughing up of blood. 

There is no history of worsening chest pain with deep respirations.  There

is no history of temperatures, chills or infectious exposure.  There is no

history of night sweats, weight loss or appetite change prior to the above

events.  There is no history of leg or calf pains.  No history of syncope

or diaphoresis.  No history of recent travel or trauma.



REVIEW OF SYSTEMS:  No history of nausea, vomiting or diarrhea.  No acute

urinary symptoms.  No new neurologic complaints.  Rest of the review of

systems negative.



ALLERGIES:  NO KNOWN ALLERGIES.



SOCIAL HISTORY:  Positive for extensive tobacco usage.  No alcohol.



FAMILY HISTORY:  No inheritable diseases.



HOME MEDICATIONS:  Include Microzide, Flomax, Seroquel, Medrol Dosepak,

Breo Ellipta, Ecotrin, albuterol HFA.



PHYSICAL EXAMINATION:

GENERAL:  The patient appears comfortable this morning.  He is not short of

breath at rest.  He states his chest pain is almost gone.

VITAL SIGNS:  Temperature is 98.3, pulse is 73, respirations 18/20, blood

pressure 135/74.  Oxygen saturation on nasal cannula is 97% to 99%.

HEENT:  Normocephalic, atraumatic.

NECK:  No JVD.

CARDIOVASCULAR:  Systolic ejection murmur at the lower left sternal border.

No S3 gallop.

LUNGS:  Clear bilaterally.

EXTREMITIES:  No clubbing, cyanosis or edema.  Calves are nontender to

palpation.

GI:  Abdomen is soft, nontender and nondistended.  Bowel sounds are

positive.

SKIN:  No acute rash.

NEUROLOGIC:  Limited at the present time.



PERTINENT LABORATORY DATA:  CAT scan of the chest was done as an angiogram

protocol.  There is no pulmonary embolism or aortic dissection noted. 

There is also no focal consolidation, pleural effusion, mass or nodule

noted.  There is no lymphadenopathy.  CBC:  White count 3.6, hemoglobin

13.1, hematocrit 38.6, platelets of 198.  D-dimer is slightly elevated at

533.  Complete metabolic profile:  Alkaline phosphatase 37.  Rest of the

metabolic profiles within normal limits.



IMPRESSION:

1.  Chest pain-resolving.

2.  Chronic obstructive pulmonary disease.

3.  Mild anemia.

4.  History of colon cancer.

5.  Hypertension.



PLAN:  The patient presents to [facility] with acute onset of

left-sided chest pain-starting about 2 hours prior to his arrival at the 
emergency

room.  He offers no other new pulmonary symptoms.  I did review the CAT scan of

the chest-done as an angiogram protocol.  The CAT scan angiogram is

negative for pulmonary embolism or aortic dissection.  The CAT scan is also

negative for any acute pulmonary abnormalities.  On physical exam, the

patient's lungs are clear.  Oxygen saturation on nasal cannula is 97% to

99%.  I would continue with the current nebulizer treatments and advise changing

back to oral steroids.  The patient was on Medrol as an outpatient.  As

above, the patient states that his chest pain is "almost gone".  His

clinical status is significantly improved-compared to yesterday. 

Additional pulmonary intervention will be based on the clinical status of

the patient.  I did discuss the above with Dr. Pedro.



Thank you very much for this pulmonary consultation.







__________________________________________

Declan Summers MD





DD:  11/17/2017 7:15:38

DT:  11/17/2017 7:21:14

Job # [id number]



[facility]

## 2017-11-17 NOTE — CP.PCM.DIS
<Claus Whittaker - Last Filed: 11/17/17 19:06>





Provider





- Provider


Date of Admission: 


11/16/17 15:31





Attending physician: 


Priscila Pedro MD





Time Spent in preparation of Discharge (in minutes): 40





Hospital Course





- Lab Results


Lab Results: 


 Micro Results





11/16/17 15:33   Urine,Clean Catch   Urine Culture - Final


                            No Growth (<1,000 CFU/ML)





 Most Recent Lab Values











WBC  3.6 10^3/ul (4.5-11.0)  L  11/17/17  06:20    


 


RBC  3.99 10^6/uL (3.5-6.1)   11/17/17  06:20    


 


Hgb  13.1 g/dL (14.0-18.0)  L  11/17/17  06:20    


 


Hct  38.6 % (42.0-52.0)  L  11/17/17  06:20    


 


MCV  96.7 fl (80.0-105.0)   11/17/17  06:20    


 


MCH  32.8 pg (25.0-35.0)   11/17/17  06:20    


 


MCHC  33.9 g/dl (31.0-37.0)   11/17/17  06:20    


 


RDW  12.4 % (11.5-14.5)   11/17/17  06:20    


 


Plt Count  198 10^3/uL (120.0-450.0)   11/17/17  06:20    


 


MPV  10.0 fl (7.0-11.0)   11/17/17  06:20    


 


Gran %  51.7 % (50.0-68.0)   11/16/17  13:55    


 


Lymph % (Auto)  37.5 % (22.0-35.0)  H  11/16/17  13:55    


 


Mono % (Auto)  7.4 % (1.0-6.0)  H  11/16/17  13:55    


 


Eos % (Auto)  3.2 % (1.5-5.0)   11/16/17  13:55    


 


Baso % (Auto)  0.2 % (0.0-3.0)   11/16/17  13:55    


 


Gran #  2.09  (1.4-6.5)   11/16/17  13:55    


 


Lymph #  1.5  (1.2-3.4)   11/16/17  13:55    


 


Mono #  0.3  (0.1-0.6)   11/16/17  13:55    


 


Eos #  0.1  (0.0-0.7)   11/16/17  13:55    


 


Baso #  0.01 K/mm3 (0.0-2.0)   11/16/17  13:55    


 


PT  10.6 SECONDS (9.4-12.5)   11/17/17  06:20    


 


INR  0.96  (0.93-1.08)   11/17/17  06:20    


 


APTT  33.3 Seconds (25.1-36.5)   11/16/17  13:55    


 


D-Dimer, Quantitative  533 ng/mL (0-243)  H  11/16/17  13:55    


 


pO2  23 mm/Hg (30-55)  L  11/16/17  18:21    


 


VBG pH  7.32  (7.32-7.43)   11/16/17  18:21    


 


VBG pCO2  65.0  (40-60)  H  11/16/17  18:21    


 


VBG HCO3  33.5 mmol/l (21-28)  H  11/16/17  18:21    


 


VBG Total CO2  35.5 mmol.L (22-28)  H  11/16/17  18:21    


 


VBG O2 Sat (Calc)  47.5 % (40-65)   11/16/17  18:21    


 


VBG Base Excess  5.3 mmol/L (0.0-2.0)  H  11/16/17  18:21    


 


VBG Potassium  4.0 mmol/L (3.6-5.2)   11/16/17  18:21    


 


Sodium  139.0 mmol/L (132-148)   11/16/17  18:21    


 


Chloride  105.0 mmol/L ()   11/16/17  18:21    


 


Glucose  94 mg/dl ()   11/16/17  18:21    


 


Lactate  1.0 mmol/L (0.7-2.1)   11/16/17  18:21    


 


FiO2  21.0 %  11/16/17  18:21    


 


Sodium  141 mmol/L (132-148)   11/17/17  06:20    


 


Potassium  4.5 mmol/L (3.6-5.0)   11/17/17  06:20    


 


Chloride  107 mmol/L ()   11/17/17  06:20    


 


Carbon Dioxide  28 mmol/L (21-33)   11/17/17  06:20    


 


Anion Gap  11  (10-20)   11/17/17  06:20    


 


BUN  16 mg/dL (7-21)   11/17/17  06:20    


 


Creatinine  1.3 mg/dl (0.8-1.5)   11/17/17  06:20    


 


Est GFR ( Amer)  > 60   11/17/17  06:20    


 


Est GFR (Non-Af Amer)  52   11/17/17  06:20    


 


Random Glucose  116 mg/dL ()  H  11/17/17  06:20    


 


Calcium  9.9 mg/dL (8.4-10.5)   11/17/17  06:20    


 


Magnesium  1.8 mg/dL (1.7-2.2)   11/16/17  13:55    


 


Total Bilirubin  1.1 mg/dL (0.2-1.3)   11/17/17  06:20    


 


AST  25 U/L (17-59)   11/17/17  06:20    


 


ALT  24 U/L (7-56)   11/17/17  06:20    


 


Alkaline Phosphatase  48 U/L ()   11/17/17  06:20    


 


Lactate Dehydrogenase  397 U/L (333-699)   11/17/17  06:20    


 


Total Creatine Kinase  69 U/L ()   11/17/17  06:20    


 


Troponin I  < 0.01 ng/mL  11/17/17  06:20    


 


NT-Pro-B Natriuret Pep  72.4 pg/mL (0-450)   11/16/17  13:55    


 


Total Protein  7.4 g/dL (5.8-8.3)   11/17/17  06:20    


 


Albumin  4.0 g/dL (3.0-4.8)   11/17/17  06:20    


 


Globulin  3.4 gm/dL  11/17/17  06:20    


 


Albumin/Globulin Ratio  1.2  (1.1-1.8)   11/17/17  06:20    


 


Venous Blood Potassium  4.0 mmol/L (3.6-5.2)   11/16/17  18:21    


 


Urine Color  Yellow  (YELLOW)   11/16/17  15:33    


 


Urine Appearance  Clear  (CLEAR)   11/16/17  15:33    


 


Urine pH  6.5  (4.7-8.0)   11/16/17  15:33    


 


Ur Specific Gravity  1.010  (1.005-1.035)   11/16/17  15:33    


 


Urine Protein  Negative mg/dL (<30 mg/dL)   11/16/17  15:33    


 


Urine Glucose (UA)  Negative mg/dL (NEGATIVE)   11/16/17  15:33    


 


Urine Ketones  Negative mg/dL (NEGATIVE)   11/16/17  15:33    


 


Urine Blood  Trace-intact  (NEGATIVE)  H  11/16/17  15:33    


 


Urine Nitrate  Negative  (NEGATIVE)   11/16/17  15:33    


 


Urine Bilirubin  Negative  (NEGATIVE)   11/16/17  15:33    


 


Urine Urobilinogen  1.0 E.U./dL (<1 E.U./dL)  H  11/16/17  15:33    


 


Ur Leukocyte Esterase  Negative Roderick/uL (NEGATIVE)   11/16/17  15:33    


 


Urine RBC  2 - 5 /hpf (0-2)   11/16/17  15:33    


 


Urine WBC  0 - 2 /hpf (0-6)   11/16/17  15:33    














- Hospital Course


Hospital Course: 





maricarmen Pavon is a 86 y/o male w/ a PMHx of COPD, colon cancer s/p resection, left 

eye ca s/p excision, BPH, and HTN who presents w/ intermittent left-sided neck 

pain which radiates down his left arm and left upper back for the past 2 hours. 

Daughter, Kandi, is bedside and provides more information about patient. Pt 

stated that the pain is sharp and electric and worsens when he moves, sits up 

or strains his neck. Pt lives at home with daughter and attends a daily adult 

 center and states that the pain began right after he turned his neck. 

Pt also c/o associated left arm numbness and tingling and pain in the left 

supraclavicular area. Pt states that he did not follow up with Dr. Hedrick 

after recent discharge. 


Patient was transferred to remote [facility] for monitoring. Troponin I negative x3. 

EKGs were unchanged from prior visit. Patient received duoneb treatments and 

steroids. Cardio and Pulm were consulted and cleared patient for discharge. D-

dimer was elevated (533) in ED and so CTA chest was ordered and was negative 

for PE. PT evaluated the patient and was ambulating well. Patient improved and 

was not complaining of any chest pain or neck/UE pain on the morning of 

discharge. Pt is prescribed medrol dose pack and will f/u Dr. Hedrick. 

patient's daughter was contacted and informed about discharge instructions and 

stated that pt does not require refills. 





- Date & Time of H&P


Date of H&P: 11/16/17


Time of H&P: 16:54





Discharge Exam





- Head Exam


Head Exam: ATRAUMATIC, NORMAL INSPECTION, NORMOCEPHALIC





- Eye Exam


Eye Exam: absent: Normal appearance


Additional comments: 





L eye s/p excision


R eye vision loss








- ENT Exam


ENT Exam: Mucous Membranes Moist





- Neck Exam


Neck exam: Normal Inspection





- Respiratory Exam


Respiratory Exam: Clear to PA & Lateral, NORMAL BREATHING PATTERN, 

UNREMARKABLE.  absent: Rales, Rhonchi, Wheezes, Stridor





- Cardiovascular Exam


Cardiovascular Exam: RRR, +S1, +S2.  absent: Gallop, JVD, Rubs





- GI/Abdominal Exam


GI & Abdominal Exam: Normal Bowel Sounds, Soft, Unremarkable.  absent: Distended

, Tenderness





- Extremities Exam


Extremities exam: full ROM, normal inspection





- Back Exam


Back exam: NORMAL INSPECTION





- Neurological Exam


Neurological exam: Alert, Normal Gait





- Psychiatric Exam


Psychiatric exam: Normal Affect, Normal Mood





- Skin


Skin Exam: Normal Color, Warm





Discharge Plan





- Discharge Medications


Prescriptions: 


Methylprednisolone [Medrol Dose Pack (21 tabs)] 4 mg PO DAILY #21 mg





- Follow Up Plan


Condition: FAIR


Disposition: HOME/ ROUTINE


Instructions:  Chest Pain (DC), Constipation (DC), High Fiber Diet (DC), Heart 

Healthy Diet (DC)


Additional Instructions: 


- please call Dr. Hedrick and follow up with him in 3 days.


- please continue your home medications as prescribed


- please take the medrol dose pack as prescribed 


- if you experience worsening shortness of breath, cough, fevers/chills, or 

chest pain please return to ER for evaluation  


Referrals: 


Haseeb Matos MD [Staff Provider] - 





<Priscila Pedro - Last Filed: 11/18/17 16:52>





Provider





- Provider


Date of Admission: 


11/16/17 15:31





Attending physician: 


Priscila Pedro MD








Hospital Course





- Lab Results


Lab Results: 


 Micro Results





11/16/17 15:33   Urine,Clean Catch   Urine Culture - Final


                            No Growth (<1,000 CFU/ML)





 Most Recent Lab Values











WBC  3.6 10^3/ul (4.5-11.0)  L  11/17/17  06:20    


 


RBC  3.99 10^6/uL (3.5-6.1)   11/17/17  06:20    


 


Hgb  13.1 g/dL (14.0-18.0)  L  11/17/17  06:20    


 


Hct  38.6 % (42.0-52.0)  L  11/17/17  06:20    


 


MCV  96.7 fl (80.0-105.0)   11/17/17  06:20    


 


MCH  32.8 pg (25.0-35.0)   11/17/17  06:20    


 


MCHC  33.9 g/dl (31.0-37.0)   11/17/17  06:20    


 


RDW  12.4 % (11.5-14.5)   11/17/17  06:20    


 


Plt Count  198 10^3/uL (120.0-450.0)   11/17/17  06:20    


 


MPV  10.0 fl (7.0-11.0)   11/17/17  06:20    


 


Gran %  51.7 % (50.0-68.0)   11/16/17  13:55    


 


Lymph % (Auto)  37.5 % (22.0-35.0)  H  11/16/17  13:55    


 


Mono % (Auto)  7.4 % (1.0-6.0)  H  11/16/17  13:55    


 


Eos % (Auto)  3.2 % (1.5-5.0)   11/16/17  13:55    


 


Baso % (Auto)  0.2 % (0.0-3.0)   11/16/17  13:55    


 


Gran #  2.09  (1.4-6.5)   11/16/17  13:55    


 


Lymph #  1.5  (1.2-3.4)   11/16/17  13:55    


 


Mono #  0.3  (0.1-0.6)   11/16/17  13:55    


 


Eos #  0.1  (0.0-0.7)   11/16/17  13:55    


 


Baso #  0.01 K/mm3 (0.0-2.0)   11/16/17  13:55    


 


PT  10.6 SECONDS (9.4-12.5)   11/17/17  06:20    


 


INR  0.96  (0.93-1.08)   11/17/17  06:20    


 


APTT  33.3 Seconds (25.1-36.5)   11/16/17  13:55    


 


D-Dimer, Quantitative  533 ng/mL (0-243)  H  11/16/17  13:55    


 


pO2  23 mm/Hg (30-55)  L  11/16/17  18:21    


 


VBG pH  7.32  (7.32-7.43)   11/16/17  18:21    


 


VBG pCO2  65.0  (40-60)  H  11/16/17  18:21    


 


VBG HCO3  33.5 mmol/l (21-28)  H  11/16/17  18:21    


 


VBG Total CO2  35.5 mmol.L (22-28)  H  11/16/17  18:21    


 


VBG O2 Sat (Calc)  47.5 % (40-65)   11/16/17  18:21    


 


VBG Base Excess  5.3 mmol/L (0.0-2.0)  H  11/16/17  18:21    


 


VBG Potassium  4.0 mmol/L (3.6-5.2)   11/16/17  18:21    


 


Sodium  139.0 mmol/L (132-148)   11/16/17  18:21    


 


Chloride  105.0 mmol/L ()   11/16/17  18:21    


 


Glucose  94 mg/dl ()   11/16/17  18:21    


 


Lactate  1.0 mmol/L (0.7-2.1)   11/16/17  18:21    


 


FiO2  21.0 %  11/16/17  18:21    


 


Sodium  141 mmol/L (132-148)   11/17/17  06:20    


 


Potassium  4.5 mmol/L (3.6-5.0)   11/17/17  06:20    


 


Chloride  107 mmol/L ()   11/17/17  06:20    


 


Carbon Dioxide  28 mmol/L (21-33)   11/17/17  06:20    


 


Anion Gap  11  (10-20)   11/17/17  06:20    


 


BUN  16 mg/dL (7-21)   11/17/17  06:20    


 


Creatinine  1.3 mg/dl (0.8-1.5)   11/17/17  06:20    


 


Est GFR ( Amer)  > 60   11/17/17  06:20    


 


Est GFR (Non-Af Amer)  52   11/17/17  06:20    


 


Random Glucose  116 mg/dL ()  H  11/17/17  06:20    


 


Calcium  9.9 mg/dL (8.4-10.5)   11/17/17  06:20    


 


Magnesium  1.8 mg/dL (1.7-2.2)   11/16/17  13:55    


 


Total Bilirubin  1.1 mg/dL (0.2-1.3)   11/17/17  06:20    


 


AST  25 U/L (17-59)   11/17/17  06:20    


 


ALT  24 U/L (7-56)   11/17/17  06:20    


 


Alkaline Phosphatase  48 U/L ()   11/17/17  06:20    


 


Lactate Dehydrogenase  397 U/L (333-699)   11/17/17  06:20    


 


Total Creatine Kinase  69 U/L ()   11/17/17  06:20    


 


Troponin I  < 0.01 ng/mL  11/17/17  06:20    


 


NT-Pro-B Natriuret Pep  72.4 pg/mL (0-450)   11/16/17  13:55    


 


Total Protein  7.4 g/dL (5.8-8.3)   11/17/17  06:20    


 


Albumin  4.0 g/dL (3.0-4.8)   11/17/17  06:20    


 


Globulin  3.4 gm/dL  11/17/17  06:20    


 


Albumin/Globulin Ratio  1.2  (1.1-1.8)   11/17/17  06:20    


 


Venous Blood Potassium  4.0 mmol/L (3.6-5.2)   11/16/17  18:21    


 


Urine Color  Yellow  (YELLOW)   11/16/17  15:33    


 


Urine Appearance  Clear  (CLEAR)   11/16/17  15:33    


 


Urine pH  6.5  (4.7-8.0)   11/16/17  15:33    


 


Ur Specific Gravity  1.010  (1.005-1.035)   11/16/17  15:33    


 


Urine Protein  Negative mg/dL (<30 mg/dL)   11/16/17  15:33    


 


Urine Glucose (UA)  Negative mg/dL (NEGATIVE)   11/16/17  15:33    


 


Urine Ketones  Negative mg/dL (NEGATIVE)   11/16/17  15:33    


 


Urine Blood  Trace-intact  (NEGATIVE)  H  11/16/17  15:33    


 


Urine Nitrate  Negative  (NEGATIVE)   11/16/17  15:33    


 


Urine Bilirubin  Negative  (NEGATIVE)   11/16/17  15:33    


 


Urine Urobilinogen  1.0 E.U./dL (<1 E.U./dL)  H  11/16/17  15:33    


 


Ur Leukocyte Esterase  Negative Roderick/uL (NEGATIVE)   11/16/17  15:33    


 


Urine RBC  2 - 5 /hpf (0-2)   11/16/17  15:33    


 


Urine WBC  0 - 2 /hpf (0-6)   11/16/17  15:33    














Attending/Attestation





- Attestation


I have personally seen and examined this patient.: Yes


I have fully participated in the care of the patient.: Yes


I have reviewed all pertinent clinical information, including history, physical 

exam and plan: Yes


Notes (Text): 





11/18/17 16:51





Attending note;





Patient seen and examined with resident.





Patient is a87-year-old male with a past medical history  of COPD, colon cancer 

s/p resection, left eye cancer s/p excision, BPH, and HTN who presents with 

intermittent left-sided neck pain which radiates down his left arm.


Most likely musculoskeletal pain.


Cardiac enzymes 3 negative.


Cardiology evaluation with Dr. Morrissey appreciated.





COPD; continue DuoNeb treatment.





Discharge home today with close follow-up with PMD and cardiology.








Upon discharge the patient will follow up with PMD .





Diagnosis;


COPD


Atypical chest pain


Hypertension


BPH

## 2017-11-17 NOTE — CP.PCM.PCO
Addendum


Addendum: 





11/17/17 13:41


Daughter, Kandi, was contacted and told that the patient is discharged and 

will be going home today whenever she is ready to pick him up. Daughter was 

also asked if the patient had all the needed meds and nebulizer treatments/

equipment at home and she stated that he does have all his meds and does not 

require any refills. It was explained to her that he would be prescribed one 

more (medrol dose pack) and that he would need to follow up with Dr. Hedrick 

after discharge.

## 2017-11-17 NOTE — CON
DATE:



CONSULT SERVICE:  Cardiology.



REASON FOR CONSULTATION/FOLLOWUP:  Neck pain, sharp pain in the chest and

middle of the clavicle, cardiac evaluation, rule out CAD.



BRIEF CLINICAL HISTORY:  This is an 87-year-old male legally blind who

lives at  center; history of hypertension; COPD; colon cancer,

status post resection; left eye cancer, status post excision; benign

prostatic hypertrophy; and hypertension, who states he was in a 

center, felt sharp pain just below the clavicle and neck pain and left arm

felt numb, so the patient told the nurse and nurse called the ambulance and

brought here.  Denies any chest pain, dyspnea on exertion.  The patient was

recently discharged from here and denies any prior episode of chest pain,

dyspnea on exertion, or shortness of breath on exertion.



PAST MEDICAL HISTORY:  Significant for hypertension; colon cancer, status

post resection; history of eye cancer, status post eye resection; COPD.



PAST SURGICAL HISTORY:  Significant for colon cancer, history of colon

resection, history of cholecystectomy, history of colon resection in 1994,

history of left eye excision because of eye cancer in 1992, and history of

right inguinal hernia as well.



FAMILY HISTORY:  Noncontributory.



SOCIAL HISTORY:  Previous smoker, quit in 2000.  History of socially drink

alcohol.



ALLERGIES:  NO KNOWN DRUG ALLERGY.



PREVIOUS CARDIAC WORKUP:  As follows, the patient had a stress test on

05/12/2017.  That was a Lexiscan that shows ______ myocardial perfusion

study, mild fixed defect suggestive of an injury, fixed anterior-inferior

defect probably due to diaphragmatic attenuation, no reversible ischemia

noted.  Ejection fraction 60%.  The patient had echocardiography done on

05/12/2017 that showed ejection fraction 60% to 65%, trace aortic

regurgitation, mild mitral regurgitation, mild tricuspid regurgitation,

trace pulmonary insufficiency.



REVIEW OF SYSTEMS:  As per HPI.



PHYSICAL EXAMINATION:

VITAL SIGNS:  As follows; temperature afebrile, heart rate 73, and blood

pressure 135/74.

HEENT:  PERRLA.  Extraocular muscles intact.

NECK:  Supple.  No carotid bruits or thyromegaly.

CHEST:  Clear to auscultation.

HEART:  S1 and S2 regular.

ABDOMEN:  Soft.

EXTREMITIES:  Clubbing and cyanosis negative.



LABORATORY DATA:  EKG shows normal sinus, right bundle-branch block,

first-degree AV block, rate 72, no acute ST-T changes noted.  Blood work up

as follows:  WBC 3.6, hemoglobin 13.8, hematocrit 38.6, and platelet count

198.  Chemistry showed sodium 141, potassium 4.5, chloride 107, carbon

dioxide 28, anion gap of 11, BUN 16 and creatinine 1.6.  Troponin 0.01 x2

negative.



IMPRESSION:  Atypical chest pain, musculoskeletal, given history of recent

stress on 05/12/2017, there seems to be no reversible ischemia, history of

recent echo, preserved LV function dated 05/12/2017, mild mitral

regurgitation, mild tricuspid regurgitation ______ ejection fraction 60%,

history of colon cancer, history of legally blind, history of excision of

the eye for the cancer, atypical chest pain.



RECOMMENDATION:  We will add third set of CPK and troponin.  If third set

of troponin is negative, the patient is okay to be discharged from

cardiology point of view.  We will discuss with you.  We will follow with

you.  We will get TSH, review the last TSH also.  No further cardiac workup

is planned.  If troponin remains negative, the patient is okay to be

discharged.



Thank you Dr. Pedro for providing us the opportunity in taking care of

Xiang Pavon.





__________________________________________

Guille Morrissey MD



DD:  11/17/2017 9:27:32

DT:  11/17/2017 10:58:42

Job # [id number]

## 2018-06-15 NOTE — ED PDOC
Arrival/HPI





- General


Historian: Patient





- History of Present Illness


Time/Duration: Prior to Arrival


Symptom Onset: Sudden


Symptom Course: Improving


Quality: Aching, Tightness


Severity Level: 7





- General


Chief Complaint: Chest Pain





- History of Present Illness


Narrative History of Present Illness (Text): 


86 M with pmh of HTN, colon ca s/p resection, eye cancer s/p l eye blindness 

and removal presents with chest pain. Pt states that this morning he went to 

 a package from the floor and he started to feel L sided chest pain and 

shoulder pain. His daughter than called the EMS. Pt denies this ever occurring 

before but admits to being more shortness of breath in the past few weeks. He 

denies any nausea, vomiting, diaphoresis. He states that the pain was 7/10 in 

severity.  He denies any headache, dizziness, f/c, palpitations, abd pain, n/v/

d. 


 (Domingo Millard)





Past Medical History





- Provider Review


Nursing Documentation Reviewed: Yes





- Infectious Disease


Hx of Infectious Diseases: None





- Tetanus Immunization


Tetanus Immunization: Unknown





- Cardiac


Hx Cardiac Disorders: Yes


Hx Hypertension: Yes





- Pulmonary


Hx Respiratory Disorders: No





- Neurological


Hx Neurological Disorder: No





- HEENT


Hx HEENT Disorder: Yes


Hx Blind: Yes (left eye cancer)


Hx Glaucoma: Yes (right eye)





- Renal


Hx Renal Disorder: No





- Endocrine/Metabolic


Hx Endocrine Disorders: No





- Hematological/Oncological


Hx Blood Disorders: No





- Integumentary


Hx Dermatological Disorder: No





- Musculoskeletal/Rheumatological


Hx Musculoskeletal Disorders: No





- Gastrointestinal


Hx Gastrointestinal Disorders: No





- Genitourinary/Gynecological


Hx Genitourinary Disorders: No





- Psychiatric


Hx Psychophysiologic Disorder: No


Hx Depression: No


Hx Emotional Abuse: No


Hx Physical Abuse: No


Hx Substance Use: No





- Surgical History


Hx Eye Surgery: Yes


Other/Comment: intestinal surgery





- Anesthesia


Hx Anesthesia: Yes


Hx Anesthesia Reactions: No


Hx Malignant Hyperthermia: No





- Suicidal Assessment


Feels Threatened In Home Enviroment: No





Family/Social History





- Physician Review


Nursing Documentation Reviewed: Yes


Family/Social History: CVA/TIA (father ), Neoplasm/Cancer (mother)


Smoking Status: Former Smoker


Hx Alcohol Use: No


Hx Substance Use: No


Hx Substance Use Treatment: No





Allergies/Home Meds


Allergies/Adverse Reactions: 


Allergies





No Known Allergies Allergy (Verified 09/15/16 14:44)


 








Home Medications: 


 Home Meds











 Medication  Instructions  Recorded  Confirmed


 


amLODIPine [Norvasc] 5 mg PO DAILY 09/15/16 09/15/16














Review of Systems





- Review of Systems


Constitutional: absent: Fatigue, Fevers


Eyes: absent: Vision Changes


ENT: absent: Hearing Changes


Respiratory: SOB.  absent: Cough, Sputum, Wheezing


Cardiovascular: Chest Pain.  absent: Palpitations, Edema


Gastrointestinal: absent: Abdominal Pain, Constipation, Diarrhea, Nausea, 

Vomiting


Genitourinary Male: absent: Dysuria, Frequency


Musculoskeletal: absent: Arthralgias, Back Pain


Skin: absent: Rash


Neurological: absent: Headache, Dizziness, Focal Weakness


Endocrine: absent: Diaphoresis


Psychiatric: absent: Anxiety, Depression





Physical Exam


Blood Pressure: Hypertensive


Pulse: Regular


Respiratory Rate: Normal


Appearance: Positive for: Well-Appearing, Non-Toxic, Comfortable


Pain Distress: None


Mental Status: Positive for: Alert and Oriented X 3





- Systems Exam


Head: Present: Atraumatic, Normocephalic


Pupils: Present: PERRL


Extroacular Muscles: Present: EOMI


Mouth: Present: Moist Mucous Membranes


Neck: Present: Normal Range of Motion


Respiratory/Chest: Present: Clear to Auscultation, Good Air Exchange.  No: 

Respiratory Distress, Accessory Muscle Use


Cardiovascular: Present: Regular Rate and Rhythm, Normal S1, S2.  No: Murmurs


Abdomen: Present: Normal Bowel Sounds.  No: Tenderness, Distention, Peritoneal 

Signs


Upper Extremity: Present: Normal Inspection.  No: Cyanosis, Edema


Lower Extremity: Present: Normal Inspection.  No: Edema


Neurological: Present: GCS=15, CN II-XII Intact, Speech Normal


Skin: Present: Warm, Dry, Normal Color.  No: Rashes


Psychiatric: Present: Alert, Oriented x 3, Normal Insight, Normal Concentration


Vital Signs











  Pulse Resp BP Pulse Ox


 


 05/11/17 17:05  78  18  141/96 H  96














Medical Decision Making


ED Course and Treatment: 


Patient Seen With Resident:


In agreement with resident note. Patient was seen and evaluated with resident, 

came up with plan and treatment together.  (Madhu Bustos DO)








Impression: 86 M with pmh of HTN, colon ca s/p resection, eye cancer s/p l eye 

blindness and removal presents with chest pain. 





Differential Diagnosis included but are not limited to:  R/O MI vs 

costochondritis 





Plan: 


- CBC, CMP, BNP, cardiac Iso 


- EKG stat 


- Aspirin 325mg stat 


- CXR


- Reassess and disposition





Prior Visits:


Notes and results from previous visits were reviewed.


On 09/05/16 patient came in complaining of UTI/ prostatitis.





Progress Notes:


EKG:


Ordered, reviewed, and independently interpreted the EKG.


Rate :  71 BPM


Rhythm : Sinus rhythm with marked sinus arrhythmia with first degree AV block, 

RBB


Interpretation : 


Comparison : 9/22/13 05/11/17 16:00


Pt hemodynamically stable. Denies any chest pain. 





05/11/17 17:11


Spoke to Dr Matos which stated he has only seen this patient once and he 

has been his partners pt. He rec admitting patient under hospitalist service 

under observation to r/o MI and any cardiac cause. 








05/11/17 17:42


Spoke to Dr Vega which will accept the patient on her service. Pt will be 

placed on Tele.  











 ([place])





- Lab Interpretations


Lab Results: 








 05/11/17 16:17 





 05/11/17 16:17 





 Lab Results





05/11/17 16:17: Sodium 142, Potassium 4.3, Chloride 105, Carbon Dioxide 29, 

Anion Gap 12, BUN 19, Creatinine 1.1, Est GFR (African Amer) > 60, Est GFR (Non-

Af Amer) > 60, Random Glucose 91, Calcium 9.5, Total Bilirubin 0.6, AST 28, ALT 

54, Alkaline Phosphatase 45, Lactate Dehydrogenase 417, Total Creatine Kinase 76

, Troponin I < 0.01, NT-Pro-B Natriuret Pep 59.7, Total Protein 7.5, Albumin 3.8

, Globulin 3.6, Albumin/Globulin Ratio 1.1


05/11/17 16:17: WBC 3.9 L D, RBC 3.65, Hgb 11.8 L, Hct 35.0 L, MCV 95.9, MCH 

32.3, MCHC 33.7, RDW 12.8, Plt Count 212, MPV 9.7, Gran % 46.2 L, Lymph % (Auto

) 38.0 H, Mono % (Auto) 10.1 H, Eos % (Auto) 5.4 H, Baso % (Auto) 0.3, Gran # 

1.79, Lymph # 1.5, Mono # 0.4, Eos # 0.2, Baso # 0.01











- RAD Interpretation


Radiology Orders: 








05/11/17 15:17


CXR [CHEST PORTABLE] [RAD] Stat 














- Medication Orders


Current Medication Orders: 











Discontinued Medications





Aspirin (Ecotrin)  325 mg PO STAT STA


   Stop: 05/11/17 15:18











- PA / NP / Resident Statement


MD/ has reviewed & agrees with the documentation as recorded.


MD/ has examined the patient and agrees with the treatment plan.





Disposition/Present on Arrival





- Present on Arrival


Any Indicators Present on Arrival: No


History of DVT/PE: No


History of Uncontrolled Diabetes: No


Urinary Catheter: No


History of Decub. Ulcer: No


History Surgical Site Infection Following: None





- Disposition


Have Diagnosis and Disposition been Completed?: Yes


Disposition Time: 17:10


Patient Plan: Observation





- Disposition


Diagnosis: 


 Chest pain





Disposition: HOSPITALIZED


Patient Problems: 


 Current Active Problems











Problem Status Onset


 


Chest pain Acute  











Condition: FAIR


Discharge Instructions (ExitCare):  Chest Pain (ED)


Referrals: 


Haseeb Matos MD [Staff Provider] - Follow up with primary
negative...

## 2019-04-08 ENCOUNTER — HOSPITAL ENCOUNTER (EMERGENCY)
Dept: HOSPITAL 42 - ED | Age: 84
Discharge: HOME | End: 2019-04-08
Payer: MEDICARE

## 2019-04-08 VITALS — HEART RATE: 61 BPM | DIASTOLIC BLOOD PRESSURE: 65 MMHG | SYSTOLIC BLOOD PRESSURE: 170 MMHG

## 2019-04-08 VITALS — BODY MASS INDEX: 29.2 KG/M2

## 2019-04-08 VITALS — TEMPERATURE: 97.8 F | OXYGEN SATURATION: 100 % | RESPIRATION RATE: 18 BRPM

## 2019-04-08 DIAGNOSIS — I10: ICD-10-CM

## 2019-04-08 DIAGNOSIS — Z85.038: ICD-10-CM

## 2019-04-08 DIAGNOSIS — K59.00: ICD-10-CM

## 2019-04-08 DIAGNOSIS — J44.9: ICD-10-CM

## 2019-04-08 DIAGNOSIS — N39.0: Primary | ICD-10-CM

## 2019-04-08 DIAGNOSIS — Z87.891: ICD-10-CM

## 2019-04-08 LAB
ALBUMIN SERPL-MCNC: 3.6 G/DL (ref 3–4.8)
ALBUMIN/GLOB SERPL: 1.2 {RATIO} (ref 1.1–1.8)
ALT SERPL-CCNC: 11 U/L (ref 7–56)
APPEARANCE UR: (no result)
APTT BLD: 32.3 SECONDS (ref 26.9–38.3)
AST SERPL-CCNC: 26 U/L (ref 17–59)
BACTERIA #/AREA URNS HPF: (no result) /HPF
BASOPHILS # BLD AUTO: 0.01 K/MM3 (ref 0–2)
BASOPHILS NFR BLD: 0.3 % (ref 0–3)
BILIRUB UR-MCNC: NEGATIVE MG/DL
BUN SERPL-MCNC: 17 MG/DL (ref 7–21)
CALCIUM SERPL-MCNC: 9.4 MG/DL (ref 8.4–10.5)
COLOR UR: YELLOW
EOSINOPHIL # BLD: 0.1 10*3/UL (ref 0–0.7)
EOSINOPHIL NFR BLD: 2.5 % (ref 1.5–5)
ERYTHROCYTE [DISTWIDTH] IN BLOOD BY AUTOMATED COUNT: 12.8 % (ref 11.5–14.5)
GFR NON-AFRICAN AMERICAN: 57
GLUCOSE UR STRIP-MCNC: NEGATIVE MG/DL
HGB BLD-MCNC: 12.2 G/DL (ref 14–18)
INR PPP: 1.04
LEUKOCYTE ESTERASE UR-ACNC: (no result) LEU/UL
LIPASE SERPL-CCNC: 55 U/L (ref 23–300)
LYMPHOCYTES # BLD: 1 10*3/UL (ref 1.2–3.4)
LYMPHOCYTES NFR BLD AUTO: 31.2 % (ref 22–35)
MCH RBC QN AUTO: 32.4 PG (ref 25–35)
MCHC RBC AUTO-ENTMCNC: 33.1 G/DL (ref 31–37)
MCV RBC AUTO: 97.9 FL (ref 80–105)
MONOCYTES # BLD AUTO: 0.4 10*3/UL (ref 0.1–0.6)
MONOCYTES NFR BLD: 12.9 % (ref 1–6)
PH UR STRIP: 6 [PH] (ref 4.7–8)
PLATELET # BLD: 193 10^3/UL (ref 120–450)
PMV BLD AUTO: 9.8 FL (ref 7–11)
PROT UR STRIP-MCNC: NEGATIVE MG/DL
PROTHROMBIN TIME: 11.7 SECONDS (ref 9.4–12.5)
RBC # BLD AUTO: 3.77 10^6/UL (ref 3.5–6.1)
RBC # UR STRIP: NEGATIVE /UL
RBC #/AREA URNS HPF: (no result) /HPF (ref 0–2)
SP GR UR STRIP: 1.02 (ref 1–1.03)
UROBILINOGEN UR STRIP-ACNC: 0.2 E.U./DL
WBC # BLD AUTO: 3.2 10^3/UL (ref 4.5–11)

## 2019-04-08 PROCEDURE — 85025 COMPLETE CBC W/AUTO DIFF WBC: CPT

## 2019-04-08 PROCEDURE — 81001 URINALYSIS AUTO W/SCOPE: CPT

## 2019-04-08 PROCEDURE — 96374 THER/PROPH/DIAG INJ IV PUSH: CPT

## 2019-04-08 PROCEDURE — 74019 RADEX ABDOMEN 2 VIEWS: CPT

## 2019-04-08 PROCEDURE — 83690 ASSAY OF LIPASE: CPT

## 2019-04-08 PROCEDURE — 99284 EMERGENCY DEPT VISIT MOD MDM: CPT

## 2019-04-08 PROCEDURE — 87086 URINE CULTURE/COLONY COUNT: CPT

## 2019-04-08 PROCEDURE — 83735 ASSAY OF MAGNESIUM: CPT

## 2019-04-08 PROCEDURE — 85610 PROTHROMBIN TIME: CPT

## 2019-04-08 PROCEDURE — 80053 COMPREHEN METABOLIC PANEL: CPT

## 2019-04-08 PROCEDURE — 85730 THROMBOPLASTIN TIME PARTIAL: CPT

## 2019-04-08 NOTE — ED PDOC
Arrival/HPI





- General


Time Seen by Provider: 04/08/19 08:00


Historian: Patient





- History of Present Illness


Narrative History of Present Illness (Text): 





04/08/19 08:30


87 y/o M w/ h/o COPD, colon cancer s/p resection, left eye ca s/p excision, BPH,

and HTN, presents to the ED for evaluation of constipation and generalized 

abdominal discomfort since past 4 days. Patient notes passing gas when 

attempting to move bowels, unimproved after taking laxatives. Patient denies any

other associated somatic complaints. Patient denies any fevers, chills, 

headache, dizziness, chest pain, shortness of breath, dyspnea on exertion, 

cough, diaphoresis, nausea, vomiting, back pain, neck pain, or any other 

complaints.


 


PMD: Dr. Matos





Time/Duration: < week


Symptom Onset: Gradual


Symptom Course: Unchanged


Activities at Onset: Light


Context: Home





Past Medical History





- Provider Review


Nursing Documentation Reviewed: Yes





- Infectious Disease


Hx of Infectious Diseases: None





- Tetanus Immunization


Tetanus Immunization: Unknown





- Cardiac


Hx Hypertension: Yes





- Pulmonary


Hx Chronic Obstructive Pulmonary Disease (COPD): Yes





- Neurological


Hx Neurological Disorder: Yes


Hx Dementia: Yes





- HEENT


Hx HEENT Disorder: Yes (EPISTAXIS)


Hx Blind: Yes (left eye cancer)


Hx Deafness: Yes (Hard-of-hearing)


Hx Glaucoma: Yes (right eye)


Other/Comment: legally blind





- Renal


Hx Renal Disorder: No





- Endocrine/Metabolic


Hx Endocrine Disorders: No





- Hematological/Oncological


Hx Blood Disorders: No


Hx Cancer: Yes (Colon ca s/p resection)





- Integumentary


Hx Dermatological Disorder: No





- Musculoskeletal/Rheumatological


Hx Musculoskeletal Disorders: Yes


Hx Falls: Yes





- Gastrointestinal


Hx Gastrointestinal Disorders: Yes (COLON CA WITH RESECTION,INGUINAL HERNIA)





- Genitourinary/Gynecological


Hx Genitourinary Disorders: Yes


Hx Prostate Problems: Yes (BPH)


Hx Urinary Tract Infection: Yes





- Psychiatric


Hx Psychophysiologic Disorder: No


Hx Depression: Yes


Hx Emotional Abuse: No


Hx Physical Abuse: No


Hx Substance Use: No





- Surgical History


Other/Comment: colon resection; left eye removal





- Anesthesia


Hx Anesthesia: Yes


Hx Anesthesia Reactions: No


Hx Malignant Hyperthermia: No





- Suicidal Assessment


Feels Threatened In Home Enviroment: No





Family/Social History


Family/Social History: No Known Family HX


Smoking Status: Former Smoker


Hx Alcohol Use: No


Hx Substance Use: No


Hx Substance Use Treatment: No





Allergies/Home Meds


Allergies/Adverse Reactions: 


Allergies





No Known Allergies Allergy (Verified 11/16/17 19:55)


   








Home Medications: 


                                    Home Meds











 Medication  Instructions  Recorded  Confirmed


 


Donepezil [Aricept] 10 mg PO DAILY 11/09/18 11/09/18


 


Famotidine [Pepcid] 20 mg PO DAILY 11/09/18 11/09/18














Review of Systems





- Physician Review


All systems were reviewed & negative as marked: Yes





- Review of Systems


Constitutional: absent: Fevers


Eyes: absent: Vision Changes


Respiratory: absent: SOB


Cardiovascular: absent: Chest Pain


Gastrointestinal: Abdominal Pain, Constipation.  absent: Diarrhea, Nausea, 

Vomiting, Appetite Changes


Genitourinary Male: absent: Dysuria, Hematuria, Urinary Output Changes


Musculoskeletal: absent: Back Pain, Neck Pain


Skin: absent: Rash


Neurological: absent: Headache, Dizziness, Focal Weakness


Endocrine: absent: Diaphoresis


Psychiatric: absent: Anxiety





Physical Exam


Vital Signs Reviewed: Yes





Vital Signs











  Temp Pulse Resp BP Pulse Ox


 


 04/08/19 08:01  97.8 F  65  18  158/101 H  100











Temperature: Afebrile


Blood Pressure: Hypertensive


Pulse: Regular


Respiratory Rate: Normal


Appearance: Positive for: Well-Appearing, Non-Toxic, Comfortable


Pain Distress: None


Mental Status: Positive for: Alert and Oriented X 3





- Systems Exam


Head: Present: Atraumatic, Normocephalic


Neck: Present: Normal Range of Motion


Respiratory/Chest: Present: Clear to Auscultation, Good Air Exchange.  No: 

Respiratory Distress, Accessory Muscle Use


Cardiovascular: Present: Regular Rate and Rhythm, Normal S1, S2.  No: Murmurs


Abdomen: No: Tenderness, Distention, Peritoneal Signs


Rectal: Present: Other (Stool noted in rectal wall)


Back: Present: Normal Inspection


Upper Extremity: Present: Normal Inspection.  No: Cyanosis, Edema


Lower Extremity: Present: Normal Inspection.  No: Edema


Neurological: Present: GCS=15, Speech Normal


Skin: Present: Warm, Dry, Normal Color.  No: Rashes


Psychiatric: Present: Alert, Oriented x 3, Normal Insight, Normal Concentration





Medical Decision Making


ED Course and Treatment: 





04/08/19 08:30


Impression:


88 year old male presents to the ED for evaluation of constipation and 

generalized abdominal discomfort. bedside exam has hard stool in rectal vault





Plan:


-- Labs


-- IV fluids


-- X-ray of Abdomen


-- Urinalysis 


-- Reassess and disposition





Prior Visits:


Notes and results from previous visits were reviewed. 





Progress Notes:





04/08/19 10:33


X-ray of Abdomen reviewed by radiologist, shows:


FINDINGS:


BOWEL:


Normal. No obstruction. No free air. 


BONES:


Normal.


OTHER FINDINGS:


None.


IMPRESSION:


No active disease.


04/08/19 14:45





labs neg. abd sof tno distention nomral bs, urine treated. no e/o of sepsis. pt 

had large bm asking for dc. 





- RAD Interpretation


Radiology Orders: 











04/08/19 08:30


ABD 2 VIEWS (FLAT/UP OR DECUB) [RAD] Stat 














- Medication Orders


Current Medication Orders: 











Sodium Chloride (Sodium Chloride 0.9%)  500 mls @ 999 mls/hr IV .Q31M STA


   Stop: 04/08/19 09:00











- Scribe Statement


The provider has reviewed the documentation as recorded by the Scribe


Isadora Bai.





All medical record entries made by the Scribe were at my direction and 

personally dictated by me. I have reviewed the chart and agree that the record 

accurately reflects my personal performance of the history, physical exam, 

medical decision making, and the department course for this patient. I have also

 personally directed, reviewed, and agree with the discharge instructions and 

disposition.








Disposition/Present on Arrival





- Present on Arrival


Any Indicators Present on Arrival: No


History of DVT/PE: No


History of Uncontrolled Diabetes: No


Urinary Catheter: No


History of Decub. Ulcer: No


History Surgical Site Infection Following: None





- Disposition


Have Diagnosis and Disposition been Completed?: Yes


Diagnosis: 


 Urinary tract infection, Constipation





Disposition: HOME/ ROUTINE


Disposition Time: 12:30


Condition: STABLE


Discharge Instructions (ExitCare):  Urinary Tract Infections in Adults, 

Constipation in Adults


Additional Instructions: 


return to er with worsening symptoms or concerns. 


Prescriptions: 


Cefpodoxime [Vantin] 100 mg PO BID #20 tab


Referrals: 


 [facility] [Outside] - Follow up with primary


[facility] [place] with primary


Danii Dawson MD [Medical Doctor] - Follow up with primary


Forms:  Plei (English)